# Patient Record
Sex: FEMALE | Race: BLACK OR AFRICAN AMERICAN | Employment: FULL TIME | URBAN - METROPOLITAN AREA
[De-identification: names, ages, dates, MRNs, and addresses within clinical notes are randomized per-mention and may not be internally consistent; named-entity substitution may affect disease eponyms.]

---

## 2017-01-25 ENCOUNTER — ED HISTORICAL/CONVERTED ENCOUNTER (OUTPATIENT)
Dept: OTHER | Age: 32
End: 2017-01-25

## 2017-09-15 ENCOUNTER — OP HISTORICAL/CONVERTED ENCOUNTER (OUTPATIENT)
Dept: OTHER | Age: 32
End: 2017-09-15

## 2017-10-23 ENCOUNTER — OP HISTORICAL/CONVERTED ENCOUNTER (OUTPATIENT)
Dept: OTHER | Age: 32
End: 2017-10-23

## 2017-11-02 ENCOUNTER — OP HISTORICAL/CONVERTED ENCOUNTER (OUTPATIENT)
Dept: OTHER | Age: 32
End: 2017-11-02

## 2018-02-07 ENCOUNTER — OP HISTORICAL/CONVERTED ENCOUNTER (OUTPATIENT)
Dept: OTHER | Age: 33
End: 2018-02-07

## 2018-04-23 ENCOUNTER — OP HISTORICAL/CONVERTED ENCOUNTER (OUTPATIENT)
Dept: OTHER | Age: 33
End: 2018-04-23

## 2018-05-31 ENCOUNTER — OP HISTORICAL/CONVERTED ENCOUNTER (OUTPATIENT)
Dept: OTHER | Age: 33
End: 2018-05-31

## 2018-06-03 ENCOUNTER — ED HISTORICAL/CONVERTED ENCOUNTER (OUTPATIENT)
Dept: OTHER | Age: 33
End: 2018-06-03

## 2019-05-14 ENCOUNTER — ED HISTORICAL/CONVERTED ENCOUNTER (OUTPATIENT)
Dept: OTHER | Age: 34
End: 2019-05-14

## 2019-05-18 ENCOUNTER — ED HISTORICAL/CONVERTED ENCOUNTER (OUTPATIENT)
Dept: OTHER | Age: 34
End: 2019-05-18

## 2019-10-05 ENCOUNTER — ED HISTORICAL/CONVERTED ENCOUNTER (OUTPATIENT)
Dept: OTHER | Age: 34
End: 2019-10-05

## 2020-03-17 ENCOUNTER — ED HISTORICAL/CONVERTED ENCOUNTER (OUTPATIENT)
Dept: OTHER | Age: 35
End: 2020-03-17

## 2020-06-08 ENCOUNTER — ED HISTORICAL/CONVERTED ENCOUNTER (OUTPATIENT)
Dept: OTHER | Age: 35
End: 2020-06-08

## 2020-06-11 ENCOUNTER — ED HISTORICAL/CONVERTED ENCOUNTER (OUTPATIENT)
Dept: OTHER | Age: 35
End: 2020-06-11

## 2020-07-12 ENCOUNTER — ED HISTORICAL/CONVERTED ENCOUNTER (OUTPATIENT)
Dept: OTHER | Age: 35
End: 2020-07-12

## 2020-07-22 ENCOUNTER — TELEPHONE (OUTPATIENT)
Dept: FAMILY MEDICINE CLINIC | Age: 35
End: 2020-07-22

## 2020-07-25 ENCOUNTER — ED HISTORICAL/CONVERTED ENCOUNTER (OUTPATIENT)
Dept: OTHER | Age: 35
End: 2020-07-25

## 2020-07-29 ENCOUNTER — TELEPHONE (OUTPATIENT)
Dept: FAMILY MEDICINE CLINIC | Age: 35
End: 2020-07-29

## 2020-07-30 ENCOUNTER — ED HISTORICAL/CONVERTED ENCOUNTER (OUTPATIENT)
Dept: OTHER | Age: 35
End: 2020-07-30

## 2020-08-29 ENCOUNTER — ED HISTORICAL/CONVERTED ENCOUNTER (OUTPATIENT)
Dept: OTHER | Age: 35
End: 2020-08-29

## 2020-09-07 ENCOUNTER — ED HISTORICAL/CONVERTED ENCOUNTER (OUTPATIENT)
Dept: OTHER | Age: 35
End: 2020-09-07

## 2020-09-12 ENCOUNTER — APPOINTMENT (OUTPATIENT)
Dept: GENERAL RADIOLOGY | Age: 35
End: 2020-09-12
Attending: EMERGENCY MEDICINE
Payer: COMMERCIAL

## 2020-09-12 ENCOUNTER — HOSPITAL ENCOUNTER (EMERGENCY)
Age: 35
Discharge: HOME | End: 2020-09-12
Attending: EMERGENCY MEDICINE
Payer: COMMERCIAL

## 2020-09-12 VITALS
RESPIRATION RATE: 17 BRPM | DIASTOLIC BLOOD PRESSURE: 80 MMHG | TEMPERATURE: 99.3 F | OXYGEN SATURATION: 100 % | WEIGHT: 175 LBS | BODY MASS INDEX: 26.52 KG/M2 | HEIGHT: 68 IN | HEART RATE: 76 BPM | SYSTOLIC BLOOD PRESSURE: 124 MMHG

## 2020-09-12 DIAGNOSIS — F41.1 ANXIETY STATE: Primary | ICD-10-CM

## 2020-09-12 PROCEDURE — 74011250637 HC RX REV CODE- 250/637: Performed by: EMERGENCY MEDICINE

## 2020-09-12 PROCEDURE — 93005 ELECTROCARDIOGRAM TRACING: CPT

## 2020-09-12 PROCEDURE — 99284 EMERGENCY DEPT VISIT MOD MDM: CPT

## 2020-09-12 RX ORDER — ALPRAZOLAM 1 MG/1
1 TABLET ORAL
Qty: 20 TAB | Refills: 0 | Status: SHIPPED | OUTPATIENT
Start: 2020-09-12 | End: 2020-12-03 | Stop reason: ALTCHOICE

## 2020-09-12 RX ORDER — ALPRAZOLAM 0.5 MG/1
1 TABLET ORAL
Status: COMPLETED | OUTPATIENT
Start: 2020-09-12 | End: 2020-09-12

## 2020-09-12 RX ADMIN — ALPRAZOLAM 1 MG: 0.5 TABLET ORAL at 20:39

## 2020-09-12 NOTE — ED TRIAGE NOTES
Patient reports feeling short of breath secondary to her neck feeling inflamed. H/o anxiety, appears very anxious in triage asking if she is having stroke or seizure.

## 2020-09-13 ENCOUNTER — HOSPITAL ENCOUNTER (EMERGENCY)
Age: 35
Discharge: HOME | End: 2020-09-13
Admitting: NURSE PRACTITIONER
Payer: COMMERCIAL

## 2020-09-13 ENCOUNTER — APPOINTMENT (OUTPATIENT)
Dept: CT IMAGING | Age: 35
End: 2020-09-13
Attending: NURSE PRACTITIONER
Payer: COMMERCIAL

## 2020-09-13 VITALS
SYSTOLIC BLOOD PRESSURE: 175 MMHG | WEIGHT: 171 LBS | DIASTOLIC BLOOD PRESSURE: 94 MMHG | RESPIRATION RATE: 18 BRPM | HEART RATE: 82 BPM | BODY MASS INDEX: 25.91 KG/M2 | TEMPERATURE: 98.4 F | HEIGHT: 68 IN | OXYGEN SATURATION: 100 %

## 2020-09-13 DIAGNOSIS — M54.2 NECK PAIN: Primary | ICD-10-CM

## 2020-09-13 DIAGNOSIS — F41.1 ANXIETY STATE: ICD-10-CM

## 2020-09-13 PROCEDURE — 99284 EMERGENCY DEPT VISIT MOD MDM: CPT

## 2020-09-13 PROCEDURE — 70490 CT SOFT TISSUE NECK W/O DYE: CPT

## 2020-09-13 PROCEDURE — 74011250637 HC RX REV CODE- 250/637: Performed by: NURSE PRACTITIONER

## 2020-09-13 RX ORDER — ALPRAZOLAM 0.5 MG/1
1 TABLET ORAL
Status: CANCELLED | OUTPATIENT
Start: 2020-09-13 | End: 2020-09-13

## 2020-09-13 RX ORDER — ALPRAZOLAM 0.5 MG/1
1 TABLET ORAL
Status: COMPLETED | OUTPATIENT
Start: 2020-09-13 | End: 2020-09-13

## 2020-09-13 RX ADMIN — ALPRAZOLAM 1 MG: 0.5 TABLET ORAL at 20:44

## 2020-09-13 NOTE — DISCHARGE INSTRUCTIONS

## 2020-09-13 NOTE — ED TRIAGE NOTES
Pt stated was here yesterday prescribed anxiety meds. Pt stated seen for SOB and neck swelling. Pt stated [facility] and [facility] sent her here. Pt initially stated she did not want anything done to include vitals because she just wants an x-ray.

## 2020-09-13 NOTE — ED PROVIDER NOTES
28y F with PMH anxiety. She is c/o SOB and fullness feeling in throat. No pains, No trauma. No SI or HI. She has an appointmment with her therapist this week. Compliant on Vistaril but doesn't seem to be helping recently. Nothing she can recal has triggered this change. 28y F with anxiety    Past Medical History:   Diagnosis Date    Anxiety     Asthma        History reviewed. No pertinent surgical history. History reviewed. No pertinent family history. Social History     Socioeconomic History    Marital status: SINGLE     Spouse name: Not on file    Number of children: Not on file    Years of education: Not on file    Highest education level: Not on file   Occupational History    Not on file   Social Needs    Financial resource strain: Not on file    Food insecurity     Worry: Not on file     Inability: Not on file    Transportation needs     Medical: Not on file     Non-medical: Not on file   Tobacco Use    Smoking status: Never Smoker    Smokeless tobacco: Never Used   Substance and Sexual Activity    Alcohol use: Never     Frequency: Never    Drug use: Never    Sexual activity: Not on file   Lifestyle    Physical activity     Days per week: Not on file     Minutes per session: Not on file    Stress: Not on file   Relationships    Social connections     Talks on phone: Not on file     Gets together: Not on file     Attends Zoroastrianism service: Not on file     Active member of club or organization: Not on file     Attends meetings of clubs or organizations: Not on file     Relationship status: Not on file    Intimate partner violence     Fear of current or ex partner: Not on file     Emotionally abused: Not on file     Physically abused: Not on file     Forced sexual activity: Not on file   Other Topics Concern    Not on file   Social History Narrative    Not on file         ALLERGIES: Patient has no known allergies. Review of Systems   Constitutional: Negative.   Negative for chills, fatigue and fever. HENT: Negative. Negative for congestion, nosebleeds and sore throat. Eyes: Negative. Negative for pain, discharge and visual disturbance. Respiratory: Positive for chest tightness and shortness of breath. Negative for cough. Cardiovascular: Negative for chest pain, palpitations and leg swelling. Gastrointestinal: Negative for abdominal pain, blood in stool, constipation, diarrhea, nausea and vomiting. Endocrine: Negative. Genitourinary: Negative. Negative for difficulty urinating, dysuria, pelvic pain and vaginal bleeding. Musculoskeletal: Negative. Negative for arthralgias, back pain and myalgias. Skin: Negative. Negative for rash and wound. Allergic/Immunologic: Negative. Neurological: Negative. Negative for dizziness, syncope, weakness, numbness and headaches. Hematological: Negative. Psychiatric/Behavioral: Negative for agitation, confusion and suicidal ideas. The patient is nervous/anxious. All other systems reviewed and are negative. Vitals:    09/12/20 1936   BP: (!) 152/88   Pulse: 96   Resp: 18   Temp: 99.3 °F (37.4 °C)   SpO2: 100%   Weight: 79.4 kg (175 lb)   Height: 5' 8\" (1.727 m)            Physical Exam  Vitals signs and nursing note reviewed. Constitutional:       General: She is not in acute distress. Appearance: Normal appearance. She is normal weight. She is not ill-appearing. HENT:      Head: Normocephalic and atraumatic. Right Ear: External ear normal.      Left Ear: External ear normal.      Nose: Nose normal. No congestion. Mouth/Throat:      Mouth: Mucous membranes are moist.      Pharynx: Oropharynx is clear. Eyes:      Conjunctiva/sclera: Conjunctivae normal.      Pupils: Pupils are equal, round, and reactive to light. Neck:      Musculoskeletal: Neck supple. Cardiovascular:      Rate and Rhythm: Normal rate and regular rhythm. Pulses: Normal pulses. Heart sounds: Normal heart sounds. No murmur. Pulmonary:      Effort: Pulmonary effort is normal. Tachypnea present. No respiratory distress. Breath sounds: Normal breath sounds. Abdominal:      General: Abdomen is flat. Bowel sounds are normal. There is no distension. Palpations: Abdomen is soft. Tenderness: There is no abdominal tenderness. There is no guarding. Musculoskeletal: Normal range of motion. General: No swelling, tenderness, deformity or signs of injury. Right lower leg: No edema. Left lower leg: No edema. Skin:     General: Skin is warm and dry. Capillary Refill: Capillary refill takes less than 2 seconds. Findings: No bruising, lesion or rash. Neurological:      General: No focal deficit present. Mental Status: She is alert and oriented to person, place, and time. Mental status is at baseline. Cranial Nerves: No cranial nerve deficit. Sensory: No sensory deficit. Motor: No weakness. Psychiatric:         Attention and Perception: Attention normal.         Mood and Affect: Mood is anxious. Behavior: Behavior normal.         Thought Content: Thought content normal. Thought content does not include suicidal ideation. Judgment: Judgment normal.          MDM  Number of Diagnoses or Management Options  Anxiety state:   Diagnosis management comments: Normal sinus rhythm. Rate 78. No ST segment changes. No T wave Inversions. Normal Intervals. Interpreted by ED physician. Reason rule out dysarrythmia    MDM: 34yF with PMH anxiety, No si or HI, improved after xanax. Small Rx provided. Pt has close f/u    DX: anxiety  DC home.  improved           Procedures

## 2020-09-14 LAB
ATRIAL RATE: 94 BPM
CALCULATED P AXIS, ECG09: 63 DEGREES
CALCULATED R AXIS, ECG10: 67 DEGREES
CALCULATED T AXIS, ECG11: 20 DEGREES
DIAGNOSIS, 93000: NORMAL
P-R INTERVAL, ECG05: 152 MS
Q-T INTERVAL, ECG07: 348 MS
QRS DURATION, ECG06: 62 MS
QTC CALCULATION (BEZET), ECG08: 435 MS
VENTRICULAR RATE, ECG03: 94 BPM

## 2020-09-14 NOTE — DISCHARGE INSTRUCTIONS
Patient Education        Neck Pain: Care Instructions  Your Care Instructions    You can have neck pain anywhere from the bottom of your head to the top of your shoulders. It can spread to the upper back or arms. Injuries, painting a ceiling, sleeping with your neck twisted, staying in one position for too long, and many other activities can cause neck pain. Most neck pain gets better with home care. Your doctor may recommend medicine to relieve pain or relax your muscles. He or she may suggest exercise and physical therapy to increase flexibility and relieve stress. You may need to wear a special (cervical) collar to support your neck for a day or two. Follow-up care is a key part of your treatment and safety. Be sure to make and go to all appointments, and call your doctor if you are having problems. It's also a good idea to know your test results and keep a list of the medicines you take. How can you care for yourself at home? · Try using a heating pad on a low or medium setting for 15 to 20 minutes every 2 or 3 hours. Try a warm shower in place of one session with the heating pad. · You can also try an ice pack for 10 to 15 minutes every 2 to 3 hours. Put a thin cloth between the ice and your skin. · Take pain medicines exactly as directed. ¨ If the doctor gave you a prescription medicine for pain, take it as prescribed. ¨ If you are not taking a prescription pain medicine, ask your doctor if you can take an over-the-counter medicine. · If your doctor recommends a cervical collar, wear it exactly as directed. When should you call for help? Call your doctor now or seek immediate medical care if:  ? · You have new or worsening numbness in your arms, buttocks or legs. ? · You have new or worsening weakness in your arms or legs. (This could make it hard to stand up.)   ? · You lose control of your bladder or bowels. ? Watch closely for changes in your health, and be sure to contact your doctor if:  ? · Your neck pain is getting worse. ? · You are not getting better after 1 week. ? · You do not get better as expected. Where can you learn more? Go to http://www.gray.com/. Enter [id number] in the search box to learn more about \"Neck Pain: Care Instructions. \"  Current as of: March 21, 2017  Content Version: 11.5  © 0899-7619 [employer]. Care instructions adapted under license by [employer] (which disclaims liability or warranty for this information). If you have questions about a medical condition or this instruction, always ask your healthcare professional. [place] [address] any warranty or liability for your use of this information. Patient Education        Anxiety Disorder: Care Instructions  Your Care Instructions     Anxiety is a normal reaction to stress. Difficult situations can cause you to have symptoms such as sweaty palms and a nervous feeling. In an anxiety disorder, the symptoms are far more severe. Constant worry, muscle tension, trouble sleeping, nausea and diarrhea, and other symptoms can make normal daily activities difficult or impossible. These symptoms may occur for no reason, and they can affect your work, school, or social life. Medicines, counseling, and self-care can all help. Follow-up care is a key part of your treatment and safety. Be sure to make and go to all appointments, and call your doctor if you are having problems. It's also a good idea to know your test results and keep a list of the medicines you take. How can you care for yourself at home? · Take medicines exactly as directed. Call your doctor if you think you are having a problem with your medicine. · Go to your counseling sessions and follow-up appointments. · Recognize and accept your anxiety. Then, when you are in a situation that makes you anxious, say to yourself, \"This is not an emergency. I feel uncomfortable, but I am not in danger.  I can keep going even if I feel anxious. \"  · Be kind to your body:  ? Relieve tension with exercise or a massage. ? Get enough rest.  ? Avoid alcohol, caffeine, nicotine, and illegal drugs. They can increase your anxiety level and cause sleep problems. ? Learn and do relaxation techniques. See below for more about these techniques. · Engage your mind. Get out and do something you enjoy. Go to a funny movie, or take a walk or hike. Plan your day. Having too much or too little to do can make you anxious. · Keep a record of your symptoms. Discuss your fears with a good friend or family member, or join a support group for people with similar problems. Talking to others sometimes relieves stress. · Get involved in social groups, or volunteer to help others. Being alone sometimes makes things seem worse than they are. · Get at least 30 minutes of exercise on most days of the week to relieve stress. Walking is a good choice. You also may want to do other activities, such as running, swimming, cycling, or playing tennis or team sports. Relaxation techniques  Do relaxation exercises 10 to 20 minutes a day. You can play soothing, relaxing music while you do them, if you wish. · Tell others in your house that you are going to do your relaxation exercises. Ask them not to disturb you. · Find a comfortable place, away from all distractions and noise. · Lie down on your back, or sit with your back straight. · Focus on your breathing. Make it slow and steady. · Breathe in through your nose. Breathe out through either your nose or mouth. · Breathe deeply, filling up the area between your navel and your rib cage. Breathe so that your belly goes up and down. · Do not hold your breath. · Breathe like this for 5 to 10 minutes. Notice the feeling of calmness throughout your whole body.   As you continue to breathe slowly and deeply, relax by doing the following for another 5 to 10 minutes:  · Tighten and relax each muscle group in your body. You can begin at your toes and work your way up to your head. · Imagine your muscle groups relaxing and becoming heavy. · Empty your mind of all thoughts. · Let yourself relax more and more deeply. · Become aware of the state of calmness that surrounds you. · When your relaxation time is over, you can bring yourself back to alertness by moving your fingers and toes and then your hands and feet and then stretching and moving your entire body. Sometimes people fall asleep during relaxation, but they usually wake up shortly afterward. · Always give yourself time to return to full alertness before you drive a car or do anything that might cause an accident if you are not fully alert. Never play a relaxation tape while you drive a car. When should you call for help? Call 911 anytime you think you may need emergency care. For example, call if:    · You feel you cannot stop from hurting yourself or someone else. Keep the numbers for these national suicide hotlines: 3-219-102-TALK (5-892.564.8301) and 0-991-DNFWDHG (6-374.926.3359). If you or someone you know talks about suicide or feeling hopeless, get help right away. Watch closely for changes in your health, and be sure to contact your doctor if:    · You have anxiety or fear that affects your life.     · You have symptoms of anxiety that are new or different from those you had before. Where can you learn more? Go to http://abram-beatriz.info/  Enter P754 in the search box to learn more about \"Anxiety Disorder: Care Instructions. \"  Current as of: January 31, 2020               Content Version: 12.6  © 3097-7265 [employer]. Care instructions adapted under license by [employer] (which disclaims liability or warranty for this information).  If you have questions about a medical condition or this instruction, always ask your healthcare professional. [employer] disclaims any warranty or liability for your use of this information.

## 2020-09-16 ENCOUNTER — HOSPITAL ENCOUNTER (EMERGENCY)
Age: 35
Discharge: HOME HEALTH | End: 2020-09-16
Attending: EMERGENCY MEDICINE
Payer: COMMERCIAL

## 2020-09-16 VITALS
HEIGHT: 68 IN | TEMPERATURE: 98.9 F | OXYGEN SATURATION: 99 % | SYSTOLIC BLOOD PRESSURE: 136 MMHG | BODY MASS INDEX: 25.76 KG/M2 | DIASTOLIC BLOOD PRESSURE: 84 MMHG | WEIGHT: 170 LBS | HEART RATE: 84 BPM | RESPIRATION RATE: 16 BRPM

## 2020-09-16 DIAGNOSIS — F41.1 ANXIETY STATE: Primary | ICD-10-CM

## 2020-09-16 LAB
ALBUMIN SERPL-MCNC: 3.9 G/DL (ref 3.5–5)
ALBUMIN/GLOB SERPL: 0.9 {RATIO} (ref 1.1–2.2)
ALP SERPL-CCNC: 47 U/L (ref 45–117)
ALT SERPL-CCNC: 20 U/L (ref 12–78)
AMPHET UR QL SCN: NEGATIVE
ANION GAP SERPL CALC-SCNC: 6 MMOL/L (ref 5–15)
AST SERPL W P-5'-P-CCNC: 16 U/L (ref 15–37)
BARBITURATES UR QL SCN: NEGATIVE
BASOPHILS # BLD: 0 K/UL (ref 0–0.1)
BASOPHILS NFR BLD: 1 % (ref 0–1)
BENZODIAZ UR QL: NEGATIVE
BILIRUB SERPL-MCNC: 0.5 MG/DL (ref 0.2–1)
BUN SERPL-MCNC: 7 MG/DL (ref 6–20)
BUN/CREAT SERPL: 10 (ref 12–20)
CA-I BLD-MCNC: 9.8 MG/DL (ref 8.5–10.1)
CANNABINOIDS UR QL SCN: NEGATIVE
CHLORIDE SERPL-SCNC: 107 MMOL/L (ref 97–108)
CO2 SERPL-SCNC: 25 MMOL/L (ref 21–32)
COCAINE UR QL SCN: NEGATIVE
CREAT SERPL-MCNC: 0.68 MG/DL (ref 0.55–1.02)
DIFFERENTIAL METHOD BLD: ABNORMAL
DRUG SCRN COMMENT,DRGCM: NORMAL
EOSINOPHIL # BLD: 0 K/UL (ref 0–0.4)
EOSINOPHIL NFR BLD: 1 % (ref 0–7)
ERYTHROCYTE [DISTWIDTH] IN BLOOD BY AUTOMATED COUNT: 13 % (ref 11.5–14.5)
ETHANOL SERPL-MCNC: <10 MG/DL
GLOBULIN SER CALC-MCNC: 4.3 G/DL (ref 2–4)
GLUCOSE SERPL-MCNC: 93 MG/DL (ref 65–100)
HCG SERPL QL: NEGATIVE
HCT VFR BLD AUTO: 37.6 % (ref 35–47)
HGB BLD-MCNC: 12.7 % (ref 11.5–16)
IMM GRANULOCYTES # BLD AUTO: 0 K/UL (ref 0–0.04)
IMM GRANULOCYTES NFR BLD AUTO: 0 % (ref 0–0.5)
LYMPHOCYTES # BLD: 1.3 K/UL (ref 0.8–3.5)
LYMPHOCYTES NFR BLD: 39 % (ref 12–49)
MCH RBC QN AUTO: 30 PG (ref 26–34)
MCHC RBC AUTO-ENTMCNC: 33.8 G/DL (ref 30–36.5)
MCV RBC AUTO: 88.9 FL (ref 80–99)
METHADONE UR QL: NEGATIVE
MONOCYTES # BLD: 0.4 K/UL (ref 0–1)
MONOCYTES NFR BLD: 12 % (ref 5–13)
NEUTS SEG # BLD: 1.6 K/UL (ref 1.8–8)
NEUTS SEG NFR BLD: 47 % (ref 32–75)
OPIATES UR QL: NEGATIVE
PCP UR QL: NEGATIVE
PLATELET # BLD AUTO: 259 K/UL (ref 150–400)
PMV BLD AUTO: 11.6 FL (ref 8.9–12.9)
POTASSIUM SERPL-SCNC: 3.5 MMOL/L (ref 3.5–5.1)
PROT SERPL-MCNC: 8.2 G/DL (ref 6.4–8.2)
RBC # BLD AUTO: 4.23 M/UL (ref 3.8–5.2)
SODIUM SERPL-SCNC: 138 MMOL/L (ref 136–145)
WBC # BLD AUTO: 3.4 K/UL (ref 3.6–11)

## 2020-09-16 PROCEDURE — 80307 DRUG TEST PRSMV CHEM ANLYZR: CPT

## 2020-09-16 PROCEDURE — 80053 COMPREHEN METABOLIC PANEL: CPT

## 2020-09-16 PROCEDURE — 84703 CHORIONIC GONADOTROPIN ASSAY: CPT

## 2020-09-16 PROCEDURE — 74011250637 HC RX REV CODE- 250/637: Performed by: EMERGENCY MEDICINE

## 2020-09-16 PROCEDURE — 99284 EMERGENCY DEPT VISIT MOD MDM: CPT

## 2020-09-16 PROCEDURE — 85025 COMPLETE CBC W/AUTO DIFF WBC: CPT

## 2020-09-16 PROCEDURE — 36415 COLL VENOUS BLD VENIPUNCTURE: CPT

## 2020-09-16 RX ORDER — ALPRAZOLAM 0.5 MG/1
1 TABLET ORAL
Status: COMPLETED | OUTPATIENT
Start: 2020-09-16 | End: 2020-09-16

## 2020-09-16 RX ORDER — CYCLOBENZAPRINE HCL 10 MG
10 TABLET ORAL
COMMUNITY
Start: 2020-09-13 | End: 2020-12-03 | Stop reason: ALTCHOICE

## 2020-09-16 RX ORDER — HYDROXYZINE 50 MG/1
50 TABLET, FILM COATED ORAL 3 TIMES DAILY
COMMUNITY
End: 2020-12-08 | Stop reason: ALTCHOICE

## 2020-09-16 RX ORDER — ESCITALOPRAM OXALATE 10 MG/1
10 TABLET ORAL
COMMUNITY
Start: 2020-08-19 | End: 2020-12-03 | Stop reason: ALTCHOICE

## 2020-09-16 RX ADMIN — ALPRAZOLAM 1 MG: 0.5 TABLET ORAL at 09:44

## 2020-09-16 NOTE — BH NOTES
Pt assessed face to face in ED #13. Pt presented to ED via private vehicle from work ([employer]). Pt states she was sent to ED from work. States she got to work and sat on the floor and told her safety staff that she \"didn't feel like herself and scared to go to sleep at night as she might not wake up due to past TBI\". States in May 2020 she was at work, in the bathroom. States she \"dropped her phone, she bent over to pick it up and as she stood she hit her head on the tampon dispenser\". Denies LOC or problems at that time. States 1 week later, she started to have headaches. States she has had 2 brain scans which were negative and has f/u with a Neurologist, Dr. Terrie Walker. States she was dx with Concussion. States she saw him last month and has an appt with him Frid 9-. Pt states she has also f/u with a Psychiatrist, Dr. Moris Rousseau and has an appt Monday 9-. States she was started on Lexapro and Vistaril, cant take Vistaril and work. Pt states she has been in ED on 9-11 and 9-12. States she was started on Xanax which helps. Pt denies SI HI Hallucinationsor thoughts to harm self. Pt states she loves her job, has worked there for 5.5 yrs, and is worried that she will lose her job since they sent her to the ED. Pt denies access to weapons   Pt denies legal issues  Pt denies past Medical H/O  Pt denies Trauma H/O  Pt states family  H/O Mental Illness includes her mom with Anxiety  Pt denies Past IP BH Hospitalizations  Allergies NKA  Current Medications see chart  Pt lives in [place] and her mom has been staying with her since she had the Concussion. Pt feels safe to d/c home. Dr. Dany Riley consulted and states pt does not meet criteria for IP BH Treatment,  to have her f/u with Dr. Moris Rousseau and Dr. Terrie Walker as scheduled. Continue home medications. Gabino Elena RN ED notified.

## 2020-09-16 NOTE — DISCHARGE INSTRUCTIONS

## 2020-09-16 NOTE — ED PROVIDER NOTES
EMERGENCY DEPARTMENT HISTORY AND PHYSICAL EXAM      Date: 9/16/2020  Patient Name: Juvenal Dean    History of Presenting Illness     Chief Complaint   Patient presents with    Mental Health Problem       History Provided By:pt    HPI: Juvenal Dean, 29 y.o. female with a past medical history significant anxiety. seen recently and prescribed anxiety med xanax with relief. Has an appointment scheduled, vistiril in past hasnt helped. No si or HI, no cp or SOB    There are no other complaints, changes, or physical findings at this time. PCP: Bobo Marcos MD    Current Outpatient Medications   Medication Sig Dispense Refill    cyclobenzaprine (FLEXERIL) 10 mg tablet Take 10 mg by mouth nightly.  escitalopram oxalate (LEXAPRO) 10 mg tablet Take 10 mg by mouth.  hydrOXYzine HCL (ATARAX) 50 mg tablet Take 50 mg by mouth three (3) times daily.  ALPRAZolam (Xanax) 1 mg tablet Take 1 Tab by mouth every eight (8) hours as needed for Anxiety. Max Daily Amount: 3 mg. 20 Tab 0       Past History     Past Medical History:  Past Medical History:   Diagnosis Date    Anxiety     Asthma        Past Surgical History:  No past surgical history on file. Family History:  No family history on file. Social History:  Social History     Tobacco Use    Smoking status: Never Smoker    Smokeless tobacco: Never Used   Substance Use Topics    Alcohol use: Never     Frequency: Never    Drug use: Never       Allergies:  No Known Allergies      Review of Systems   Review of Systems   Constitutional: Negative. Negative for chills, fatigue and fever. HENT: Negative. Negative for congestion, nosebleeds and sore throat. Eyes: Negative. Negative for pain, discharge and visual disturbance. Respiratory: Negative. Negative for cough, chest tightness and shortness of breath. Cardiovascular: Negative for chest pain, palpitations and leg swelling.    Gastrointestinal: Negative for abdominal pain, blood in stool, constipation, diarrhea, nausea and vomiting. Endocrine: Negative. Genitourinary: Negative. Negative for difficulty urinating, dysuria, pelvic pain and vaginal bleeding. Musculoskeletal: Negative. Negative for arthralgias, back pain and myalgias. Skin: Negative. Negative for rash and wound. Allergic/Immunologic: Negative. Neurological: Negative. Negative for dizziness, syncope, weakness, numbness and headaches. Hematological: Negative. Psychiatric/Behavioral: Positive for agitation. Negative for confusion and suicidal ideas. The patient is nervous/anxious and is hyperactive. All other systems reviewed and are negative. Physical Exam   Physical Exam  Vitals signs and nursing note reviewed. Exam conducted with a chaperone present. Constitutional:       Appearance: Normal appearance. She is normal weight. HENT:      Head: Normocephalic and atraumatic. Nose: Nose normal.      Mouth/Throat:      Mouth: Mucous membranes are moist.      Pharynx: Oropharynx is clear. Eyes:      Extraocular Movements: Extraocular movements intact. Conjunctiva/sclera: Conjunctivae normal.      Pupils: Pupils are equal, round, and reactive to light. Neck:      Musculoskeletal: Normal range of motion and neck supple. Cardiovascular:      Rate and Rhythm: Normal rate and regular rhythm. Pulses: Normal pulses. Heart sounds: Normal heart sounds. Pulmonary:      Effort: Pulmonary effort is normal. No respiratory distress. Breath sounds: Normal breath sounds. Abdominal:      General: Abdomen is flat. Bowel sounds are normal. There is no distension. Palpations: Abdomen is soft. Tenderness: There is no abdominal tenderness. There is no guarding. Musculoskeletal: Normal range of motion. General: No swelling, tenderness, deformity or signs of injury. Right lower leg: No edema. Left lower leg: No edema. Skin:     General: Skin is warm and dry. Capillary Refill: Capillary refill takes less than 2 seconds. Findings: No lesion or rash. Neurological:      General: No focal deficit present. Mental Status: She is alert and oriented to person, place, and time. Mental status is at baseline. Cranial Nerves: No cranial nerve deficit. Psychiatric:         Mood and Affect: Mood is anxious. Behavior: Behavior normal.         Thought Content: Thought content normal. Thought content does not include homicidal or suicidal ideation. Thought content does not include suicidal plan. Judgment: Judgment normal.         Diagnostic Study Results     Labs -     Recent Results (from the past 12 hour(s))   METABOLIC PANEL, COMPREHENSIVE    Collection Time: 09/16/20 10:10 AM   Result Value Ref Range    Sodium 138 136 - 145 mmol/L    Potassium 3.5 3.5 - 5.1 mmol/L    Chloride 107 97 - 108 mmol/L    CO2 25 21 - 32 mmol/L    Anion gap 6 5 - 15 mmol/L    Glucose 93 65 - 100 mg/dL    BUN 7 6 - 20 mg/dL    Creatinine 0.68 0.55 - 1.02 mg/dL    BUN/Creatinine ratio 10 (L) 12 - 20      GFR est AA >60 >60 ml/min/1.73m2    GFR est non-AA >60 >60 ml/min/1.73m2    Calcium 9.8 8.5 - 10.1 mg/dL    Bilirubin, total 0.5 0.2 - 1.0 mg/dL    AST (SGOT) 16 15 - 37 U/L    ALT (SGPT) 20 12 - 78 U/L    Alk. phosphatase 47 45 - 117 U/L    Protein, total 8.2 6.4 - 8.2 g/dL    Albumin 3.9 3.5 - 5.0 g/dL    Globulin 4.3 (H) 2.0 - 4.0 g/dL    A-G Ratio 0.9 (L) 1.1 - 2.2     ETHYL ALCOHOL    Collection Time: 09/16/20 10:10 AM   Result Value Ref Range    ALCOHOL(ETHYL),SERUM <10 <10 mg/dL   HCG QL SERUM    Collection Time: 09/16/20 10:10 AM   Result Value Ref Range    HCG, Ql. Negative Negative         Radiologic Studies -   No orders to display     CT Results  (Last 48 hours)    None        CXR Results  (Last 48 hours)    None          Medical Decision Making and ED Course   I am the first provider for this patient.     I reviewed the vital signs, available nursing notes, past medical history, past surgical history, family history and social history. Vital Signs-Reviewed the patient's vital signs. Patient Vitals for the past 12 hrs:   Temp Pulse Resp BP SpO2   09/16/20 0908 98.9 °F (37.2 °C) 82 18 133/88 95 %       EKG interpretation:             Provider Notes (Medical Decision Making):   34yF with anxiety and meds at home, No SI or HI, seen by [address] [place] [address] and no admit indicated    ED Course:   Initial assessment performed. The patients presenting problems have been discussed, and they are in agreement with the care plan formulated and outlined with them. I have encouraged them to ask questions as they arise throughout their visit. CONSULTANTS:  Psychiatry behavioral health evaluated the patient. Does not meet admission criteria. Recommend discharge home to continue on the Xanax prescription prior received. She does have an appointment on Monday which they encouraged her to go to. Procedures                 Disposition         DISCHARGE PLAN:    Patient is discharged home. Discharge instructions provided. Patient is stable and improved at time of disposition. Vitals are stable. 1.   Current Discharge Medication List      CONTINUE these medications which have NOT CHANGED    Details   cyclobenzaprine (FLEXERIL) 10 mg tablet Take 10 mg by mouth nightly. escitalopram oxalate (LEXAPRO) 10 mg tablet Take 10 mg by mouth. hydrOXYzine HCL (ATARAX) 50 mg tablet Take 50 mg by mouth three (3) times daily. ALPRAZolam (Xanax) 1 mg tablet Take 1 Tab by mouth every eight (8) hours as needed for Anxiety. Max Daily Amount: 3 mg. Qty: 20 Tab, Refills: 0    Associated Diagnoses: Anxiety state           2. Follow-up Information     Follow up With Specialties Details Why Contact Info    psych  Call      Anderson Sal MD [address]  [phone]          3.   Return to ED if worse     Pt voiced they understand they plan and do not have questions at this time    Discharged      Diagnosis     Clinical Impression:   1. Anxiety state        Attestations:    Bree Natarajan MD    Please note that this dictation was completed with [employer], the computer voice recognition software. Quite often unanticipated grammatical, syntax, homophones, and other interpretive errors are inadvertently transcribed by the computer software. Please disregard these errors. Please excuse any errors that have escaped final proofreading. Thank you.

## 2020-09-16 NOTE — ED TRIAGE NOTES
Patient reports she was sent from work for a mental health evaluation. Pt crying in triage stating \" I fucked up my life\" patient started passing and banging on the wall ni triage delgadillo.  Pt did deny SI

## 2020-09-28 NOTE — ED PROVIDER NOTES
EMERGENCY DEPARTMENT HISTORY AND PHYSICAL EXAM      Date: 9/13/2020  Patient Name: Rodrigo Thompson    History of Presenting Illness     Chief Complaint   Patient presents with    Neck Pain    Shortness of Breath       History Provided By: Patient and Patient's Mother    HPI: Rodrigo Thompson, 29 y.o. female with a past medical history significant No significant past medical history presents to the ED accompanied by her mother with cc of neck swelling. She reports noting swelling to her right anterior neck onset this morning. She reports some tenderness to palpation but denies any associated trauma, fever/chills, dysphagia, sore throat or any recent illness. Of note, she was seen yesterday and d/c home with anxiety medications. She is very anxious today and admits to a lot of worrying and stressors; she has not tried or taken any of the medications prescribed. There are no other complaints, changes, or physical findings at this time. PCP: Gunner Yoon MD    Current Outpatient Medications   Medication Sig Dispense Refill    cyclobenzaprine (FLEXERIL) 10 mg tablet Take 10 mg by mouth nightly.  escitalopram oxalate (LEXAPRO) 10 mg tablet Take 10 mg by mouth.  hydrOXYzine HCL (ATARAX) 50 mg tablet Take 50 mg by mouth three (3) times daily.  ALPRAZolam (Xanax) 1 mg tablet Take 1 Tab by mouth every eight (8) hours as needed for Anxiety. Max Daily Amount: 3 mg. 20 Tab 0       Past History     Past Medical History:  Past Medical History:   Diagnosis Date    Anxiety     Asthma        Past Surgical History:  No past surgical history on file. Family History:  No family history on file. Social History:  Social History     Tobacco Use    Smoking status: Never Smoker    Smokeless tobacco: Never Used   Substance Use Topics    Alcohol use: Never     Frequency: Never    Drug use: Never       Allergies:  No Known Allergies      Review of Systems     Review of Systems   Constitutional: Negative. Negative for chills, fatigue and fever. HENT: Negative. Negative for rhinorrhea, sinus pressure, sinus pain, sore throat and trouble swallowing. Respiratory: Negative. Negative for cough, choking and shortness of breath. Cardiovascular: Negative. Negative for chest pain and palpitations. Gastrointestinal: Negative. Negative for abdominal pain, blood in stool, constipation, diarrhea, nausea and vomiting. Genitourinary: Negative. Negative for dysuria and hematuria. Musculoskeletal: Positive for joint swelling and neck pain. Skin: Negative. Negative for rash. Neurological: Negative. Negative for dizziness, speech difficulty, weakness, light-headedness and headaches. Hematological: Negative for adenopathy. Psychiatric/Behavioral: Negative for hallucinations, self-injury, sleep disturbance and suicidal ideas. The patient is nervous/anxious. All other systems reviewed and are negative. Physical Exam     Physical Exam  Vitals signs and nursing note reviewed. Constitutional:       General: She is not in acute distress. Appearance: Normal appearance. HENT:      Head: Normocephalic and atraumatic. Eyes:      Extraocular Movements: Extraocular movements intact. Conjunctiva/sclera: Conjunctivae normal.   Neck:      Musculoskeletal: Normal range of motion and neck supple. Normal range of motion. Pain with movement and muscular tenderness present. No erythema, neck rigidity, crepitus, injury or spinous process tenderness. Thyroid: No thyromegaly or thyroid tenderness. Trachea: Trachea and phonation normal. No tracheal tenderness, abnormal tracheal secretions or tracheal deviation. Cardiovascular:      Rate and Rhythm: Normal rate and regular rhythm. Heart sounds: Normal heart sounds. No murmur. Pulmonary:      Effort: Pulmonary effort is normal.      Breath sounds: Normal breath sounds. No wheezing or rales. Chest:      Chest wall: No tenderness. Abdominal:      General: Bowel sounds are normal.      Palpations: Abdomen is soft. Tenderness: There is no abdominal tenderness. There is no right CVA tenderness or left CVA tenderness. Musculoskeletal: Normal range of motion. Lymphadenopathy:      Head:      Right side of head: No tonsillar adenopathy. Cervical: No cervical adenopathy. Right cervical: No superficial, deep or posterior cervical adenopathy. Skin:     General: Skin is warm and dry. Findings: No rash. Neurological:      General: No focal deficit present. Mental Status: She is alert. Psychiatric:         Attention and Perception: Attention and perception normal. She is attentive. She does not perceive auditory or visual hallucinations. Mood and Affect: Mood is anxious. Affect is flat. Speech: Speech normal.         Behavior: Behavior normal. Behavior is cooperative. Cognition and Memory: Cognition normal.         Diagnostic Study Results     Labs -   No results found for this or any previous visit (from the past 12 hour(s)). Radiologic Studies -   CT Results (most recent):  Results from Hospital Encounter encounter on 09/13/20   CT NECK SOFT TISSUE WO CONT    Narrative Neck swelling. Right side per patient. No comparison available from the PACS archive at this time. TECHNIQUE: Axial imaging of the neck without IV contrast, with multiplanar  reformatting. Dose reduction: All CT scans at this facility are performed using dose reduction  optimization techniques as appropriate to a performed exam including the  following: Automated exposure control, adjustments of the mA and/or kV according  to patient's size, or use of iterative reconstruction technique. FINDINGS: The mucosal surfaces are grossly symmetric, and the airway is  maintained. Thyroid gland, submandibular glands, parotid glands are grossly  unremarkable for noncontrast technique.  The parapharyngeal and retropharyngeal  fat planes are maintained. No lymphadenopathy. Orbits unremarkable. Minimal  mucosal thickening left maxillary sinus. Mastoid air cells are unopacified. Bony  skeleton is intact. Lung apices clear. Impression IMPRESSION:  1. No acute findings given noncontrast technique. 2. Minimal mucosal thickening of the left maxillary sinus, likely chronic. CXR Results  (Last 48 hours)    None           Medical Decision Making and ED Course   I am the first provider for this patient. I reviewed the vital signs, available nursing notes, past medical history, past surgical history, family history and social history. Vital Signs-Reviewed the patient's vital signs. No data found. Records Reviewed: Nursing Notes and Old Medical Records    The patient presents with neck swelling with a differential diagnosis of trauma, peritonsillar abscess, lymphadenopathy, viral syndrome, pharyngitis, goiter      Provider Notes (Medical Decision Making):     CT soft tissue neck benign, VS stable - afebrile and not hypoxic. Pt given PO ativan while waiting, states she is feeling better and does not feel her throat is swollen any longer. She is able to talk in complete sentences. She was encourage to stay hydrated, discussed taking OTC benadryl should symptoms return. Encouraged f/u with PCP regarding anxiety symptoms. ED Course:   Initial assessment performed. The patients presenting problems have been discussed, and they are in agreement with the care plan formulated and outlined with them. I have encouraged them to ask questions as they arise throughout their visit. Disposition     Discharged      DISCHARGE PLAN:  1. Cannot display discharge medications since this patient is not currently admitted.     2.   Follow-up Information     Follow up With Specialties Details Why Contact Info    Edwar Lock MD Family Medicine In 2 days  [place] [address] [address] [facility] [id number] [phone]      [address] DEPT Emergency Medicine  If symptoms worsen [address] [facility] [address] [phone]        3. Return to ED if worse     Diagnosis     Clinical Impression:   1. Neck pain    2. Anxiety state        Attestations:    Ursula Sethi NP    Please note that this dictation was completed with Fancy, the computer voice recognition software. Quite often unanticipated grammatical, syntax, homophones, and other interpretive errors are inadvertently transcribed by the computer software. Please disregard these errors. Please excuse any errors that have escaped final proofreading. Thank you.

## 2020-09-29 VITALS
OXYGEN SATURATION: 99 % | HEART RATE: 95 BPM | SYSTOLIC BLOOD PRESSURE: 125 MMHG | RESPIRATION RATE: 16 BRPM | TEMPERATURE: 98.9 F | DIASTOLIC BLOOD PRESSURE: 72 MMHG | HEIGHT: 67 IN | BODY MASS INDEX: 28.17 KG/M2 | WEIGHT: 179.5 LBS

## 2020-09-29 PROBLEM — D25.9 UTERINE LEIOMYOMA: Status: ACTIVE | Noted: 2020-09-29

## 2020-09-29 PROBLEM — M54.2 NECK PAIN: Status: ACTIVE | Noted: 2020-09-29

## 2020-09-29 PROBLEM — D50.9 IRON DEFICIENCY ANEMIA: Status: ACTIVE | Noted: 2020-09-29

## 2020-09-29 RX ORDER — PROCHLORPERAZINE MALEATE 10 MG
5 TABLET ORAL
COMMUNITY
End: 2020-12-03 | Stop reason: ALTCHOICE

## 2020-09-29 RX ORDER — LANOLIN ALCOHOL/MO/W.PET/CERES
CREAM (GRAM) TOPICAL
COMMUNITY
End: 2020-12-03 | Stop reason: ALTCHOICE

## 2020-09-29 RX ORDER — TRAZODONE HYDROCHLORIDE 50 MG/1
TABLET ORAL
COMMUNITY
End: 2020-12-03 | Stop reason: ALTCHOICE

## 2020-09-29 RX ORDER — AMOXICILLIN 500 MG/1
TABLET, FILM COATED ORAL
COMMUNITY
End: 2020-12-03 | Stop reason: ALTCHOICE

## 2020-09-29 RX ORDER — PENICILLIN V POTASSIUM 500 MG/1
TABLET, FILM COATED ORAL 4 TIMES DAILY
COMMUNITY
End: 2020-12-03 | Stop reason: ALTCHOICE

## 2020-09-29 RX ORDER — CHLORHEXIDINE GLUCONATE 1.2 MG/ML
15 RINSE ORAL EVERY 12 HOURS
COMMUNITY
End: 2020-12-03 | Stop reason: ALTCHOICE

## 2020-09-29 RX ORDER — OXYCODONE AND ACETAMINOPHEN 5; 325 MG/1; MG/1
TABLET ORAL
COMMUNITY
End: 2020-12-03 | Stop reason: ALTCHOICE

## 2020-09-29 RX ORDER — HYDROCHLOROTHIAZIDE 25 MG/1
25 TABLET ORAL DAILY
COMMUNITY
End: 2020-12-03 | Stop reason: ALTCHOICE

## 2020-09-29 RX ORDER — PROMETHAZINE HYDROCHLORIDE 25 MG/1
SUPPOSITORY RECTAL
COMMUNITY
End: 2020-12-03 | Stop reason: ALTCHOICE

## 2020-09-29 RX ORDER — METHYLPREDNISOLONE 4 MG/1
TABLET ORAL
COMMUNITY
End: 2020-12-03 | Stop reason: ALTCHOICE

## 2020-09-29 RX ORDER — ACETAMINOPHEN AND CODEINE PHOSPHATE 300; 30 MG/1; MG/1
1 TABLET ORAL
COMMUNITY
End: 2020-12-03 | Stop reason: ALTCHOICE

## 2020-09-29 RX ORDER — HYDROMORPHONE HYDROCHLORIDE 4 MG/1
TABLET ORAL
COMMUNITY
End: 2020-12-03 | Stop reason: ALTCHOICE

## 2020-09-29 RX ORDER — MECLIZINE HCL 12.5 MG 12.5 MG/1
TABLET ORAL
COMMUNITY
End: 2020-12-08 | Stop reason: ALTCHOICE

## 2020-09-29 RX ORDER — ALBUTEROL SULFATE 90 UG/1
AEROSOL, METERED RESPIRATORY (INHALATION)
COMMUNITY
End: 2020-10-25

## 2020-09-29 RX ORDER — IBUPROFEN 600 MG/1
TABLET ORAL
COMMUNITY
End: 2020-12-08 | Stop reason: ALTCHOICE

## 2020-09-29 RX ORDER — DICLOFENAC SODIUM 75 MG/1
TABLET, DELAYED RELEASE ORAL
COMMUNITY
End: 2020-12-08 | Stop reason: ALTCHOICE

## 2020-09-29 RX ORDER — AMOXICILLIN 500 MG/1
500 CAPSULE ORAL
COMMUNITY
End: 2020-12-03 | Stop reason: ALTCHOICE

## 2020-10-02 ENCOUNTER — HOSPITAL ENCOUNTER (EMERGENCY)
Age: 35
Discharge: HOME | End: 2020-10-02
Attending: STUDENT IN AN ORGANIZED HEALTH CARE EDUCATION/TRAINING PROGRAM
Payer: COMMERCIAL

## 2020-10-02 VITALS
TEMPERATURE: 98.4 F | OXYGEN SATURATION: 100 % | HEIGHT: 68 IN | SYSTOLIC BLOOD PRESSURE: 120 MMHG | HEART RATE: 88 BPM | BODY MASS INDEX: 25.01 KG/M2 | DIASTOLIC BLOOD PRESSURE: 85 MMHG | RESPIRATION RATE: 16 BRPM | WEIGHT: 165 LBS

## 2020-10-02 DIAGNOSIS — J39.2 THROAT IRRITATION: Primary | ICD-10-CM

## 2020-10-02 PROCEDURE — 99281 EMR DPT VST MAYX REQ PHY/QHP: CPT

## 2020-10-02 NOTE — ED PROVIDER NOTES
EMERGENCY DEPARTMENT HISTORY AND PHYSICAL EXAM      Date: 10/2/2020  Patient Name: Roberto Morel    History of Presenting Illness     Chief Complaint   Patient presents with    Laryngitis       History Provided By: Patient    HPI: Roberto Morel, 29 y.o. female with a past medical history significant anxiety,  neck pain presents to the ED with cc of dryness, soreness to R side of throat. Patient states over the last several months she has had intermittent pain/irritation to her throat, describes as sorenesss, dryness, denies any significant pain. Occasionally states she feels like its hard to swallow, and feels like food may be getting stuck. Denies any fevers/chills, no trouble breathing, no throat swelling. No nausea/vomiting. Patient has been seen multiple times for similar compalint, last seen 9/13 in ED, with CT scan of neck which did not show any gross abnormality. Patient reports that she also has seen an ENT physician some time ago for the same complaint, but reportedly did not find any gross abnormalities. There are no other complaints, changes, or physical findings at this time. PCP: Yuko Kaufman MD    No current facility-administered medications on file prior to encounter. Current Outpatient Medications on File Prior to Encounter   Medication Sig Dispense Refill    hydroCHLOROthiazide (HYDRODIURIL) 25 mg tablet Take 25 mg by mouth daily.  ibuprofen (MOTRIN) 600 mg tablet Take  by mouth every six (6) hours as needed for Pain.  albuterol (ProAir HFA) 90 mcg/actuation inhaler Take  by inhalation.  acetaminophen-codeine (TYLENOL #3) 300-30 mg per tablet Take 1 Tab by mouth every four (4) hours as needed for Pain.  amoxicillin (AMOXIL) 500 mg capsule Take 500 mg by mouth.  amoxicillin 500 mg tab Take  by mouth.  chlorhexidine (PERIDEX) 0.12 % solution 15 mL by Swish and Spit route every twelve (12) hours.       diclofenac EC (VOLTAREN) 75 mg EC tablet Take  by mouth.  loratadine 10 mg cap Take  by mouth.  meclizine (ANTIVERT) 12.5 mg tablet Take  by mouth three (3) times daily as needed for Dizziness.  methylPREDNISolone (MEDROL DOSEPACK) 4 mg tablet Take  by mouth.  penicillin v potassium (VEETID) 500 mg tablet Take  by mouth four (4) times daily.  traZODone (DESYREL) 50 mg tablet Take  by mouth nightly.  ferrous sulfate 325 mg (65 mg iron) tablet Take  by mouth Daily (before breakfast).  HYDROmorphone (DILAUDID) 4 mg tablet Take  by mouth every three (3) hours as needed for Pain.  oxyCODONE-acetaminophen (PERCOCET) 5-325 mg per tablet Take  by mouth every four (4) hours as needed for Pain.  promethazine (Phenadoz) 25 mg suppository Insert  into rectum every six (6) hours as needed for Nausea.  prochlorperazine (COMPAZINE) 10 mg tablet Take 5 mg by mouth every six (6) hours as needed for Nausea or Vomiting.  cyclobenzaprine (FLEXERIL) 10 mg tablet Take 10 mg by mouth nightly.  escitalopram oxalate (LEXAPRO) 10 mg tablet Take 10 mg by mouth.  hydrOXYzine HCL (ATARAX) 50 mg tablet Take 50 mg by mouth three (3) times daily.  ALPRAZolam (Xanax) 1 mg tablet Take 1 Tab by mouth every eight (8) hours as needed for Anxiety. Max Daily Amount: 3 mg. 20 Tab 0       Past History     Past Medical History:  Past Medical History:   Diagnosis Date    Anxiety     Asthma     Iron deficiency anemia 9/29/2020    Neck pain 9/29/2020    Uterine leiomyoma 9/29/2020       Past Surgical History:  No past surgical history on file. Family History:  No family history on file.     Social History:  Social History     Tobacco Use    Smoking status: Never Smoker    Smokeless tobacco: Never Used   Substance Use Topics    Alcohol use: Never     Frequency: Never    Drug use: Never       Allergies:  No Known Allergies      Review of Systems     Review of Systems   Constitutional: Negative for activity change, appetite change, fever and unexpected weight change. HENT: Positive for sore throat, trouble swallowing and voice change. Negative for congestion and rhinorrhea. Respiratory: Negative for apnea, choking, shortness of breath and stridor. Cardiovascular: Negative for chest pain and palpitations. Gastrointestinal: Negative for abdominal pain, nausea and vomiting. Musculoskeletal: Positive for back pain, neck pain and neck stiffness. Skin: Negative for color change. Neurological: Negative for dizziness and headaches. Physical Exam     Physical Exam  Vitals signs and nursing note reviewed. Constitutional:       General: She is not in acute distress. Appearance: Normal appearance. She is not ill-appearing or diaphoretic. HENT:      Head: Normocephalic and atraumatic. Nose: Nose normal.      Mouth/Throat:      Lips: Pink. Mouth: Mucous membranes are moist.      Tongue: No lesions. Palate: No mass. Pharynx: Oropharynx is clear. Uvula midline. No oropharyngeal exudate or posterior oropharyngeal erythema. Eyes:      Extraocular Movements: Extraocular movements intact. Conjunctiva/sclera: Conjunctivae normal.   Neck:      Musculoskeletal: Neck supple. No edema, neck rigidity, crepitus, injury, pain with movement, spinous process tenderness or muscular tenderness. Thyroid: No thyroid mass. Vascular: No carotid bruit. Trachea: Trachea and phonation normal.   Cardiovascular:      Rate and Rhythm: Normal rate and regular rhythm. Pulmonary:      Effort: Pulmonary effort is normal. No respiratory distress. Breath sounds: Normal breath sounds. No stridor. Abdominal:      General: Abdomen is flat. Palpations: Abdomen is soft. Musculoskeletal: Normal range of motion. General: No swelling. Lymphadenopathy:      Cervical: No cervical adenopathy. Skin:     General: Skin is warm and dry.       Capillary Refill: Capillary refill takes less than 2 seconds. Neurological:      General: No focal deficit present. Mental Status: She is alert and oriented to person, place, and time. Cranial Nerves: No cranial nerve deficit. Diagnostic Study Results         Medical Decision Making   I am the first provider for this patient. I reviewed the vital signs, available nursing notes, past medical history, past surgical history, family history and social history. Vital Signs-Reviewed the patient's vital signs. Patient Vitals for the past 12 hrs:   Temp Pulse Resp BP SpO2   10/02/20 1904 98.4 °F (36.9 °C) 88 16 120/85 100 %       Records Reviewed: Previous Radiology Studies   CT scan of neck w/o contrast from 9/13, showed no acute findings in the neck. The patient presents with throat pain with a differential diagnosis of pharyngitis, laryngitis, chronic dry mouth, soft tissue injury. Provider Notes (Medical Decision Making):   Patient presents with throat irritation, dryness, trouble swallowing, chronic in nature. Recent work up did not reveal any significant pharyngeal abnormality. No recent trauma, no concerning signs of abscess/infection, or airway compromise. Patient had CT scan 2 weeks ago which was essentially normal.   No indication for repeat radiology today. [facility]         ED Course:   Initial assessment performed. The patients presenting problems have been discussed, and they are in agreement with the care plan formulated and outlined with them. I have encouraged them to ask questions as they arise throughout their visit. Discussed prior CT findings and possible nature of throat sensation. Re-assured patient. Will dc home with hurricaine solution  Will provide patient with information for ENT f/u   Return precautions discussed with patient, including returning if any worsening pain, throat swelling, trouble breathing, high fever. PROCEDURES  Procedures         PLAN:  1.    Current Discharge Medication List      START taking these medications    Details   benzocaine (HurriCaine) 20 % spra 1 Spray by Mucous Membrane route as needed for Pain. Qty: 1 Can, Refills: 0           2. Follow-up Information     Follow up With Specialties Details Why [address] [facility] Otolaryngology In 1 week If symptoms worsen Lilia R[address]os [address] [facility]  [phone]        Return to ED if worse     Diagnosis     Clinical Impression:   1.  Throat irritation

## 2020-10-02 NOTE — ED TRIAGE NOTES
Pt with neck injury from a work injury May 2020. States she has no new pain or injury, but feels that her voice is hoarse and there is swelling to right side of neck.

## 2020-10-03 NOTE — DISCHARGE INSTRUCTIONS
Take hurricane solution as prescribed as needed for pain. Follow up with ENT if sensation does not improve. Return to the ER if any worsening pain, soreness, any high fevers/chills, or trouble breathing.

## 2020-10-25 ENCOUNTER — HOSPITAL ENCOUNTER (EMERGENCY)
Age: 35
Discharge: HOME | End: 2020-10-25
Payer: COMMERCIAL

## 2020-10-25 VITALS
OXYGEN SATURATION: 100 % | RESPIRATION RATE: 18 BRPM | HEIGHT: 68 IN | TEMPERATURE: 98.8 F | WEIGHT: 174 LBS | SYSTOLIC BLOOD PRESSURE: 147 MMHG | HEART RATE: 102 BPM | BODY MASS INDEX: 26.37 KG/M2 | DIASTOLIC BLOOD PRESSURE: 93 MMHG

## 2020-10-25 DIAGNOSIS — J45.21 MILD INTERMITTENT ASTHMA WITH ACUTE EXACERBATION: Primary | ICD-10-CM

## 2020-10-25 PROCEDURE — 74011000250 HC RX REV CODE- 250: Performed by: NURSE PRACTITIONER

## 2020-10-25 PROCEDURE — 96372 THER/PROPH/DIAG INJ SC/IM: CPT

## 2020-10-25 PROCEDURE — 74011250636 HC RX REV CODE- 250/636: Performed by: NURSE PRACTITIONER

## 2020-10-25 PROCEDURE — 94640 AIRWAY INHALATION TREATMENT: CPT

## 2020-10-25 PROCEDURE — 99283 EMERGENCY DEPT VISIT LOW MDM: CPT

## 2020-10-25 RX ORDER — IPRATROPIUM BROMIDE AND ALBUTEROL SULFATE 2.5; .5 MG/3ML; MG/3ML
3 SOLUTION RESPIRATORY (INHALATION)
Status: COMPLETED | OUTPATIENT
Start: 2020-10-25 | End: 2020-10-25

## 2020-10-25 RX ORDER — DEXAMETHASONE SODIUM PHOSPHATE 10 MG/ML
10 INJECTION INTRAMUSCULAR; INTRAVENOUS ONCE
Status: COMPLETED | OUTPATIENT
Start: 2020-10-25 | End: 2020-10-25

## 2020-10-25 RX ORDER — ALBUTEROL SULFATE 90 UG/1
2 AEROSOL, METERED RESPIRATORY (INHALATION)
Qty: 1 INHALER | Refills: 0 | Status: SHIPPED | OUTPATIENT
Start: 2020-10-25 | End: 2020-11-08

## 2020-10-25 RX ORDER — PREDNISONE 50 MG/1
50 TABLET ORAL DAILY
Qty: 3 TAB | Refills: 0 | Status: SHIPPED | OUTPATIENT
Start: 2020-10-25 | End: 2020-10-28

## 2020-10-25 RX ADMIN — DEXAMETHASONE SODIUM PHOSPHATE 10 MG: 10 INJECTION, SOLUTION INTRAMUSCULAR; INTRAVENOUS at 03:01

## 2020-10-25 RX ADMIN — IPRATROPIUM BROMIDE AND ALBUTEROL SULFATE 3 ML: .5; 3 SOLUTION RESPIRATORY (INHALATION) at 02:50

## 2020-10-25 NOTE — ED TRIAGE NOTES
Pt has hx of asthma is feeling wheezy and inhaler is not working sx started last night at 10pm got some relief with inhaler at that time and went back to bed then  attempted using inhaler again with no real relief.

## 2020-10-25 NOTE — ED PROVIDER NOTES
EMERGENCY DEPARTMENT HISTORY AND PHYSICAL EXAM      Date: 10/25/2020  Patient Name: Didi Yusuf    History of Presenting Illness     Chief Complaint   Patient presents with    Wheezing       History Provided By: Patient    HPI: Didi Yusuf, 28 y.o. female with a past medical history significant asthma presents to the ED with cc of sob, wheezing, MDI not helping. There are no other complaints, changes, or physical findings at this time. PCP: Madhavi Martinez MD    No current facility-administered medications on file prior to encounter. Current Outpatient Medications on File Prior to Encounter   Medication Sig Dispense Refill    benzocaine (HurriCaine) 20 % spra 1 Spray by Mucous Membrane route as needed for Pain. 1 Can 0    hydroCHLOROthiazide (HYDRODIURIL) 25 mg tablet Take 25 mg by mouth daily.  ibuprofen (MOTRIN) 600 mg tablet Take  by mouth every six (6) hours as needed for Pain.  acetaminophen-codeine (TYLENOL #3) 300-30 mg per tablet Take 1 Tab by mouth every four (4) hours as needed for Pain.  amoxicillin (AMOXIL) 500 mg capsule Take 500 mg by mouth.  amoxicillin 500 mg tab Take  by mouth.  chlorhexidine (PERIDEX) 0.12 % solution 15 mL by Swish and Spit route every twelve (12) hours.  diclofenac EC (VOLTAREN) 75 mg EC tablet Take  by mouth.  loratadine 10 mg cap Take  by mouth.  meclizine (ANTIVERT) 12.5 mg tablet Take  by mouth three (3) times daily as needed for Dizziness.  methylPREDNISolone (MEDROL DOSEPACK) 4 mg tablet Take  by mouth.  penicillin v potassium (VEETID) 500 mg tablet Take  by mouth four (4) times daily.  traZODone (DESYREL) 50 mg tablet Take  by mouth nightly.  ferrous sulfate 325 mg (65 mg iron) tablet Take  by mouth Daily (before breakfast).  HYDROmorphone (DILAUDID) 4 mg tablet Take  by mouth every three (3) hours as needed for Pain.       oxyCODONE-acetaminophen (PERCOCET) 5-325 mg per tablet Take  by mouth every four (4) hours as needed for Pain.  promethazine (Phenadoz) 25 mg suppository Insert  into rectum every six (6) hours as needed for Nausea.  prochlorperazine (COMPAZINE) 10 mg tablet Take 5 mg by mouth every six (6) hours as needed for Nausea or Vomiting.  [DISCONTINUED] albuterol (ProAir HFA) 90 mcg/actuation inhaler Take  by inhalation.  cyclobenzaprine (FLEXERIL) 10 mg tablet Take 10 mg by mouth nightly.  escitalopram oxalate (LEXAPRO) 10 mg tablet Take 10 mg by mouth.  hydrOXYzine HCL (ATARAX) 50 mg tablet Take 50 mg by mouth three (3) times daily.  ALPRAZolam (Xanax) 1 mg tablet Take 1 Tab by mouth every eight (8) hours as needed for Anxiety. Max Daily Amount: 3 mg. 20 Tab 0       Past History     Past Medical History:  Past Medical History:   Diagnosis Date    Anxiety     Asthma     Iron deficiency anemia 9/29/2020    Neck pain 9/29/2020    Uterine leiomyoma 9/29/2020       Past Surgical History:  History reviewed. No pertinent surgical history. Family History:  History reviewed. No pertinent family history. Social History:  Social History     Tobacco Use    Smoking status: Never Smoker    Smokeless tobacco: Never Used   Substance Use Topics    Alcohol use: Never     Frequency: Never    Drug use: Never       Allergies:  No Known Allergies      Review of Systems     Review of Systems   Constitutional: Negative. HENT: Negative. Respiratory: Positive for cough, shortness of breath and wheezing. Cardiovascular: Negative. Gastrointestinal: Negative. Genitourinary: Negative. Musculoskeletal: Negative. Neurological: Negative. Physical Exam     Physical Exam  Constitutional:       Appearance: Normal appearance. She is normal weight. HENT:      Head: Normocephalic and atraumatic. Eyes:      Extraocular Movements: Extraocular movements intact. Pupils: Pupils are equal, round, and reactive to light.    Cardiovascular: Rate and Rhythm: Normal rate and regular rhythm. Pulses: Normal pulses. Heart sounds: Normal heart sounds. Pulmonary:      Effort: Pulmonary effort is normal. No respiratory distress. Breath sounds: Wheezing present. No rales. Chest:      Chest wall: No tenderness. Abdominal:      General: Abdomen is flat. Musculoskeletal: Normal range of motion. Skin:     General: Skin is warm and dry. Neurological:      General: No focal deficit present. Mental Status: She is alert and oriented to person, place, and time. Psychiatric:         Mood and Affect: Mood normal.         Behavior: Behavior normal.         Diagnostic Study Results     Labs -   No results found for this or any previous visit (from the past 12 hour(s)). Radiologic Studies -   @lastxrresult@  CT Results  (Last 48 hours)    None        CXR Results  (Last 48 hours)    None            Medical Decision Making   I am the first provider for this patient. I reviewed the vital signs, available nursing notes, past medical history, past surgical history, family history and social history. Vital Signs-Reviewed the patient's vital signs. Patient Vitals for the past 12 hrs:   Temp Pulse Resp BP SpO2   10/25/20 0226 98.8 °F (37.1 °C) (!) 113 22 (!) 147/93 98 %       Records Reviewed: Nursing Notes    The patient presents with sob with a differential diagnosis of asthma, allergic reaction, dyspnea      Provider Notes (Medical Decision Making):     MDM  Number of Diagnoses or Management Options  Mild intermittent asthma with acute exacerbation: new, needed workup  Risk of Complications, Morbidity, and/or Mortality  Presenting problems: low  Diagnostic procedures: low  Management options: low    Patient Progress  Patient progress: improved           ED Course:   Initial assessment performed. The patients presenting problems have been discussed, and they are in agreement with the care plan formulated and outlined with them.   I have encouraged them to ask questions as they arise throughout their visit. PLAN:  1. Current Discharge Medication List      START taking these medications    Details   predniSONE (DELTASONE) 50 mg tablet Take 1 Tab by mouth daily for 3 days. Qty: 3 Tab, Refills: 0         CONTINUE these medications which have CHANGED    Details   albuterol (PROVENTIL HFA, VENTOLIN HFA, PROAIR HFA) 90 mcg/actuation inhaler Take 2 Puffs by inhalation every four (4) hours as needed for Wheezing or Cough for up to 14 days. Qty: 1 Inhaler, Refills: 0           2. Follow-up Information     Follow up With Specialties Details Why Contact Info    Eric Nair MD Family Medicine In 2 days  [place] [address] [address] [place] [address]  [phone]          Return to ED if worse     Diagnosis     Clinical Impression:   1.  Mild intermittent asthma with acute exacerbation

## 2020-10-25 NOTE — LETTER
[address] [place] [address] EMERGENCY DEPT 
[facility] [address] [place] [id number] 
[phone] Work/School Note Date: 10/25/2020 To Whom It May concern: Taryn Wiley was seen and treated today in the emergency room by the following provider(s): 
Nurse Practitioner: Severa Leash, NP. Taryn Wiley is excused from work/school on 10/25/20 and 10/26/20. She is medically clear to return to work/school on 10/27/2020.   
 
 
Sincerely, 
 
 
 
 
Saroj Dykes NP

## 2020-12-03 ENCOUNTER — VIRTUAL VISIT (OUTPATIENT)
Dept: FAMILY MEDICINE CLINIC | Age: 35
End: 2020-12-03
Payer: COMMERCIAL

## 2020-12-03 DIAGNOSIS — R42 VERTIGO: Primary | ICD-10-CM

## 2020-12-03 PROBLEM — R51.9 HEADACHE: Status: ACTIVE | Noted: 2020-09-29

## 2020-12-03 PROBLEM — Z87.820 HISTORY OF CONCUSSION: Status: ACTIVE | Noted: 2020-12-03

## 2020-12-03 PROBLEM — F41.9 ANXIETY: Status: ACTIVE | Noted: 2020-12-03

## 2020-12-03 PROBLEM — K02.9 DENTAL CARIES: Status: ACTIVE | Noted: 2020-12-03

## 2020-12-03 PROCEDURE — 99213 OFFICE O/P EST LOW 20 MIN: CPT | Performed by: NURSE PRACTITIONER

## 2020-12-03 RX ORDER — HYDROXYZINE PAMOATE 25 MG/1
CAPSULE ORAL
COMMUNITY
Start: 2020-09-18 | End: 2020-12-05

## 2020-12-03 RX ORDER — ALBUTEROL SULFATE 90 UG/1
AEROSOL, METERED RESPIRATORY (INHALATION)
COMMUNITY
Start: 2020-10-25 | End: 2021-02-24

## 2020-12-03 RX ORDER — ONDANSETRON 4 MG/1
TABLET, ORALLY DISINTEGRATING ORAL
COMMUNITY
End: 2020-12-08 | Stop reason: ALTCHOICE

## 2020-12-03 NOTE — PROGRESS NOTES
Chief Complaint   Patient presents with    Dizziness     There were no vitals taken for this visit. Subjective  Annel Holden is a 28 y.o. female. HPI- Pt presented for virtual appt via [employer]. Pt stated that she was seen at [facility] urgent care on 12/2/2020 w/ c/o of \"dizziness. \" for approx 2 weeks. She first experienced the symptoms when running on a treadmill. The symptoms happen when she bends over and other certain positions. Review of her urgent care note indicated that a differential was BPPV. Pt stated that she was told she had \"fluid on her L ear\". She is not having pain but has a \"tight\" feeling. Pt suffered a concussion in June 2020 and was referred to concussion clinic and had WC appts because she hit her head at her job. Was ordered meclizine and nausea medication as having some nausea w/ the vertigo. Pt has hx of anxiety and she is anxious over the vertigo. She already has an appt w/ and ENT provider on 12/4/2020. She has 3 day off work note from urgent care. Patient Active Problem List   Diagnosis Code    Iron deficiency anemia D50.9    Uterine leiomyoma D25.9    Headache R51.9    Anxiety F41.9    Dental caries K02.9    History of concussion Z87.820     Past Medical History:   Diagnosis Date    Anxiety     Asthma     Iron deficiency anemia 9/29/2020    Neck pain 9/29/2020    Uterine leiomyoma 9/29/2020     No past surgical history on file. Current Outpatient Medications   Medication Instructions    albuterol (PROVENTIL HFA, VENTOLIN HFA, PROAIR HFA) 90 mcg/actuation inhaler No dose, route, or frequency recorded.     diclofenac EC (VOLTAREN) 75 mg EC tablet Oral    hydrOXYzine HCL (ATARAX) 50 mg, Oral, 3 TIMES DAILY    hydrOXYzine pamoate (VISTARIL) 25 mg capsule TK 1 C PO UP TO TID PRA    ibuprofen (MOTRIN) 600 mg tablet Oral, EVERY 6 HOURS AS NEEDED    loratadine 10 mg cap Oral    meclizine (ANTIVERT) 12.5 mg tablet Oral, 3 TIMES DAILY AS NEEDED    ondansetron (ZOFRAN ODT) 4 mg disintegrating tablet ondansetron 4 mg disintegrating tablet   Place 1 tablet twice a day by translingual route as needed. No Known Allergies  Social History     Tobacco Use    Smoking status: Never Smoker    Smokeless tobacco: Never Used   Substance Use Topics    Alcohol use: Never     Frequency: Never    Drug use: Never     No family history on file. Review of Systems   Constitutional: Negative for chills and fever. HENT: Positive for sinus pain. Negative for ear pain and sore throat. Respiratory: Negative for cough, shortness of breath and wheezing. Cardiovascular: Negative for chest pain and palpitations. Gastrointestinal: Positive for nausea and vomiting. Negative for abdominal pain and diarrhea. Musculoskeletal: Negative for myalgias. Neurological: Positive for dizziness and headaches. Negative for tingling and sensory change. Psychiatric/Behavioral: The patient is nervous/anxious. Objective  Physical Exam  Constitutional:       General: She is not in acute distress. Appearance: Normal appearance. HENT:      Head: Normocephalic. Right Ear: External ear normal.      Left Ear: External ear normal.      Nose: Nose normal.   Eyes:      Conjunctiva/sclera: Conjunctivae normal.   Neck:      Musculoskeletal: Neck supple. Pulmonary:      Effort: Pulmonary effort is normal.   Musculoskeletal: Normal range of motion. Comments: Of visible extremities   Skin:     Coloration: Skin is not jaundiced. Neurological:      Mental Status: She is alert and oriented to person, place, and time. Psychiatric:         Behavior: Behavior normal.         Thought Content: Thought content normal.         Judgment: Judgment normal.      Comments: Somewhat anxious       Assessment & Plan      ICD-10-CM ICD-9-CM    1. Vertigo  R42 780.4          1. Vertigo  Pt to continue use of meclizine and zofran as needed. F/U ENT note and as needed.     I have discussed the diagnosis with the patient and the intended plan as seen in the above orders. Pt/caretaker has expressed understanding. Questions were answered concerning future plans. I have discussed medication side effects and warnings as indicated with the patient as well.     Andi Dinh, DIDIER

## 2020-12-05 ENCOUNTER — HOSPITAL ENCOUNTER (EMERGENCY)
Age: 35
Discharge: HOME | End: 2020-12-05
Attending: EMERGENCY MEDICINE
Payer: COMMERCIAL

## 2020-12-05 VITALS
DIASTOLIC BLOOD PRESSURE: 98 MMHG | WEIGHT: 175 LBS | HEART RATE: 109 BPM | TEMPERATURE: 98.2 F | SYSTOLIC BLOOD PRESSURE: 124 MMHG | HEIGHT: 68 IN | OXYGEN SATURATION: 100 % | RESPIRATION RATE: 16 BRPM | BODY MASS INDEX: 26.52 KG/M2

## 2020-12-05 DIAGNOSIS — F41.1 GENERALIZED ANXIETY DISORDER: ICD-10-CM

## 2020-12-05 DIAGNOSIS — G44.209 TENSION HEADACHE: Primary | ICD-10-CM

## 2020-12-05 PROCEDURE — 74011250637 HC RX REV CODE- 250/637: Performed by: EMERGENCY MEDICINE

## 2020-12-05 PROCEDURE — 99284 EMERGENCY DEPT VISIT MOD MDM: CPT

## 2020-12-05 RX ORDER — BUTALBITAL, ACETAMINOPHEN AND CAFFEINE 300; 40; 50 MG/1; MG/1; MG/1
1 CAPSULE ORAL
Qty: 15 CAP | Refills: 0 | Status: SHIPPED | OUTPATIENT
Start: 2020-12-05 | End: 2021-02-24

## 2020-12-05 RX ORDER — BUTALBITAL, ACETAMINOPHEN AND CAFFEINE 50; 325; 40 MG/1; MG/1; MG/1
1 TABLET ORAL
Status: COMPLETED | OUTPATIENT
Start: 2020-12-05 | End: 2020-12-05

## 2020-12-05 RX ADMIN — BUTALBITAL, ACETAMINOPHEN, AND CAFFEINE 1 TABLET: 50; 325; 40 TABLET ORAL at 17:33

## 2020-12-05 NOTE — DISCHARGE INSTRUCTIONS
Take medicines as prescribed and return to the emergency room for any new or worsening symptoms. Continue all your home medications.

## 2020-12-05 NOTE — ED TRIAGE NOTES
Pt c/o feeling pressure in her head, muscle tightness in her shoulders for 10 days. Seen by ENT for dizziness and work up negative per patient. Pt thinks this may be her anxiety. Wants to be checked for a tumor or stroke.

## 2020-12-05 NOTE — ED PROVIDER NOTES
EMERGENCY DEPARTMENT HISTORY AND PHYSICAL EXAM      Date: 12/5/2020  Patient Name: Roberto Morel    History of Presenting Illness     Chief Complaint   Patient presents with    Headache    Anxiety       History Provided By: Patient    HPI: Roberto Morel, 28 y.o. female with a past medical history significant asthma and Fibroids, chronic neck pain, iron deficiency anemia and anxiety presents to the ED with cc of tension headache of insidious onset over days duration. Patient also has generalized anxiety due to the headache. No fever, body aches, cough or acute shortness of breath. No other associated symptomatology. There are no other complaints, changes, or physical findings at this time. PCP: Yuko Kaufman MD    No current facility-administered medications on file prior to encounter. Current Outpatient Medications on File Prior to Encounter   Medication Sig Dispense Refill    albuterol (PROVENTIL HFA, VENTOLIN HFA, PROAIR HFA) 90 mcg/actuation inhaler       ondansetron (ZOFRAN ODT) 4 mg disintegrating tablet ondansetron 4 mg disintegrating tablet   Place 1 tablet twice a day by translingual route as needed.  [DISCONTINUED] hydrOXYzine pamoate (VISTARIL) 25 mg capsule TK 1 C PO UP TO TID PRA      ibuprofen (MOTRIN) 600 mg tablet Take  by mouth every six (6) hours as needed for Pain.  diclofenac EC (VOLTAREN) 75 mg EC tablet Take  by mouth.  meclizine (ANTIVERT) 12.5 mg tablet Take  by mouth three (3) times daily as needed for Dizziness.  [DISCONTINUED] loratadine 10 mg cap Take  by mouth.  hydrOXYzine HCL (ATARAX) 50 mg tablet Take 50 mg by mouth three (3) times daily. Past History     Past Medical History:  Past Medical History:   Diagnosis Date    Anxiety     Asthma     Iron deficiency anemia 9/29/2020    Neck pain 9/29/2020    Uterine leiomyoma 9/29/2020       Past Surgical History:  No past surgical history on file.     Family History:  No family history on file. Social History:  Social History     Tobacco Use    Smoking status: Never Smoker    Smokeless tobacco: Never Used   Substance Use Topics    Alcohol use: Never     Frequency: Never    Drug use: Never       Allergies:  No Known Allergies      Review of Systems     Review of Systems   Constitutional: Negative. HENT: Negative. Eyes: Negative. Respiratory: Negative. Cardiovascular: Negative. Gastrointestinal: Negative. Endocrine: Negative. Genitourinary: Negative. Musculoskeletal: Negative. Skin: Negative. Neurological: Positive for headaches. Hematological: Negative. Psychiatric/Behavioral: Negative. All other systems reviewed and are negative. Physical Exam     Physical Exam  Vitals signs and nursing note reviewed. Constitutional:       Appearance: Normal appearance. She is normal weight. HENT:      Head: Normocephalic and atraumatic. Nose: Nose normal.      Mouth/Throat:      Mouth: Mucous membranes are moist.      Pharynx: Oropharynx is clear. Eyes:      Extraocular Movements: Extraocular movements intact. Conjunctiva/sclera: Conjunctivae normal.      Pupils: Pupils are equal, round, and reactive to light. Neck:      Musculoskeletal: Normal range of motion and neck supple. Cardiovascular:      Rate and Rhythm: Normal rate and regular rhythm. Pulses: Normal pulses. Heart sounds: Normal heart sounds. Pulmonary:      Effort: Pulmonary effort is normal.      Breath sounds: Normal breath sounds. Abdominal:      General: Abdomen is flat. Palpations: Abdomen is soft. Musculoskeletal: Normal range of motion. Skin:     General: Skin is warm and dry. Capillary Refill: Capillary refill takes less than 2 seconds. Neurological:      General: No focal deficit present. Mental Status: She is alert and oriented to person, place, and time.    Psychiatric:         Mood and Affect: Mood normal.         Behavior: Behavior normal.         Lab and Diagnostic Study Results     Labs -   No results found for this or any previous visit (from the past 12 hour(s)). Radiologic Studies -     CT Results  (Last 48 hours)    None        CXR Results  (Last 48 hours)    None            Medical Decision Making     - I am the first provider for this patient. - I reviewed the vital signs, available nursing notes, past medical history, past surgical history, family history and social history. - Initial assessment performed. The patients presenting problems have been discussed, and they are in agreement with the care plan formulated and outlined with them. I have encouraged them to ask questions as they arise throughout their visit. Vital Signs-Reviewed the patient's vital signs. Patient Vitals for the past 12 hrs:   Temp Pulse Resp BP SpO2   12/05/20 1634 98.2 °F (36.8 °C) (!) 109 16 (!) 124/98 100 %       Records Reviewed: Nursing Notes    ED Course/Provider Notes (Medical Decision Making): Uneventful ED course, clinical improvement with therapy, patient will be discharged to followup with PCP as directed    Disposition     Disposition: Condition stable and improved  DC- Adult Discharges: All of the diagnostic tests were reviewed and questions answered. Diagnosis, care plan and treatment options were discussed. The patient understands the instructions and will follow up as directed. The patients results have been reviewed with them. They have been counseled regarding their diagnosis. The patient verbally convey understanding and agreement of the signs, symptoms, diagnosis, treatment and prognosis and additionally agrees to follow up as recommended with their PCP in 24 - 48 hours. They also agree with the care-plan and convey that all of their questions have been answered.   I have also put together some discharge instructions for them that include: 1) educational information regarding their diagnosis, 2) how to care for their diagnosis at home, as well a 3) list of reasons why they would want to return to the ED prior to their follow-up appointment, should their condition change. Discharged    DISCHARGE PLAN:  1. Current Discharge Medication List      CONTINUE these medications which have NOT CHANGED    Details   albuterol (PROVENTIL HFA, VENTOLIN HFA, PROAIR HFA) 90 mcg/actuation inhaler       ondansetron (ZOFRAN ODT) 4 mg disintegrating tablet ondansetron 4 mg disintegrating tablet   Place 1 tablet twice a day by translingual route as needed. ibuprofen (MOTRIN) 600 mg tablet Take  by mouth every six (6) hours as needed for Pain. diclofenac EC (VOLTAREN) 75 mg EC tablet Take  by mouth.      meclizine (ANTIVERT) 12.5 mg tablet Take  by mouth three (3) times daily as needed for Dizziness. hydrOXYzine HCL (ATARAX) 50 mg tablet Take 50 mg by mouth three (3) times daily. 2.   Follow-up Information     Follow up With Specialties Details Why Contact Info    Kieran Elaine MD Family Medicine In 3 days As needed [facility] [address] [facility]  [phone]          3. Return to ED if worse   4.    Discharge Medication List as of 12/5/2020  5:34 PM      START taking these medications    Details   butalbital-acetaminophen-caff (Fioricet) -40 mg per capsule Take 1 Cap by mouth every four (4) hours as needed for Headache., Normal, Disp-15 Cap,R-0         CONTINUE these medications which have NOT CHANGED    Details   albuterol (PROVENTIL HFA, VENTOLIN HFA, PROAIR HFA) 90 mcg/actuation inhaler Historical Med      ondansetron (ZOFRAN ODT) 4 mg disintegrating tablet ondansetron 4 mg disintegrating tablet   Place 1 tablet twice a day by translingual route as needed., Historical Med      ibuprofen (MOTRIN) 600 mg tablet Take  by mouth every six (6) hours as needed for Pain., Historical Med      diclofenac EC (VOLTAREN) 75 mg EC tablet Take  by mouth., Historical Med      meclizine (ANTIVERT) 12.5 mg tablet Take  by mouth three (3) times daily as needed for Dizziness. , Historical Med      hydrOXYzine HCL (ATARAX) 50 mg tablet Take 50 mg by mouth three (3) times daily. , Historical Med               Diagnosis     Clinical Impression:   1. Tension headache    2. Generalized anxiety disorder        Attestations:    Karla Adorno MD    Please note that this dictation was completed with [employer], the computer voice recognition software. Quite often unanticipated grammatical, syntax, homophones, and other interpretive errors are inadvertently transcribed by the computer software. Please disregard these errors. Please excuse any errors that have escaped final proofreading. Thank you.

## 2020-12-08 ENCOUNTER — OFFICE VISIT (OUTPATIENT)
Dept: FAMILY MEDICINE CLINIC | Age: 35
End: 2020-12-08
Payer: COMMERCIAL

## 2020-12-08 VITALS
WEIGHT: 171 LBS | DIASTOLIC BLOOD PRESSURE: 87 MMHG | RESPIRATION RATE: 18 BRPM | SYSTOLIC BLOOD PRESSURE: 132 MMHG | HEART RATE: 114 BPM | HEIGHT: 68 IN | TEMPERATURE: 98 F | BODY MASS INDEX: 25.91 KG/M2 | OXYGEN SATURATION: 98 %

## 2020-12-08 DIAGNOSIS — R42 VERTIGO: ICD-10-CM

## 2020-12-08 DIAGNOSIS — M54.2 NECK PAIN: Primary | ICD-10-CM

## 2020-12-08 PROCEDURE — 99213 OFFICE O/P EST LOW 20 MIN: CPT | Performed by: NURSE PRACTITIONER

## 2020-12-08 RX ORDER — MELOXICAM 7.5 MG/1
7.5 TABLET ORAL DAILY
COMMUNITY
Start: 2020-12-04 | End: 2020-12-08 | Stop reason: ALTCHOICE

## 2020-12-08 RX ORDER — PREDNISONE 10 MG/1
TABLET ORAL
COMMUNITY
Start: 2020-12-07 | End: 2021-02-24

## 2020-12-08 NOTE — PROGRESS NOTES
Chief Complaint   Patient presents with    Physical    Follow-up     follow up from ENT    Neck Pain     Visit Vitals  /87 (BP 1 Location: Right arm, BP Patient Position: Sitting)   Pulse (!) 114   Temp 98 °F (36.7 °C) (Oral)   Resp 18   Ht 5' 8\" (1.727 m)   Wt 171 lb (77.6 kg)   LMP 11/17/2020   SpO2 98%   BMI 26.00 kg/m²     Subjective  Eliza Sethi is a 28 y.o. female. HPI- Pt seen today for follow-up after ER visit on 12/5/2020 for headache and anxiety. Recent history in brief:  Patient was seen at [facility] urgent care on 12/2/2020 w/ c/o of \"dizziness. \" for approx 2 weeks. She first experienced the symptoms when running on a treadmill. The symptoms happened when she bent over and other certain positions. Review of her urgent care note indicated that a differential was BPPV. Pt stated that she was told she had \"fluid on her L ear\"  Pt suffered a concussion in June 2020 and was referred to concussion clinic and had WC appts because she hit her head at her job. Has been cleared from the concussion clinic. None the less, pt is fearful that the dizziness symptoms are caused by the concussion. Then seen for virtual visit by me on 12/3/2020 with complaints of dizziness. She also had an appointment with ENT on 12/4/2020 for same complaints. She stated today that ENT provider did not think she had vertigo and physical exam was unremarkable. She went to emergency room again on 12/5/2020 with complaints of headache and neck pain. She was told to f/u w/ PCP which is why she is here today. She is quite anxious about the symptoms and no amount of reassurance seems to be sufficient. CT done in September 2020 after she hit her head at work was unremarkable. Also seeing ortho and having PT since Aug/Sept 2020 for  \"tight\" feeling in her entire L arm. Has PT once weekly. Appointment with Ortho provider was 12/4/2020. Did a steroid pack for neck. Was having a pulling sensation in the neck. Sometimes she is still scared to move her neck. She has anxiety. Having dizziness randomly. She stated she was having dizziness while she was sitting talking to me. She sees a psychiatrist about every 3 months for anxiety- not currently on anxiety medication. Patient Active Problem List   Diagnosis Code    Iron deficiency anemia D50.9    Uterine leiomyoma D25.9    Headache R51.9    Anxiety F41.9    Dental caries K02.9    History of concussion Z87.820     Past Medical History:   Diagnosis Date    Anxiety     Asthma     Iron deficiency anemia 9/29/2020    Neck pain 9/29/2020    Uterine leiomyoma 9/29/2020     History reviewed. No pertinent surgical history. Current Outpatient Medications   Medication Instructions    albuterol (PROVENTIL HFA, VENTOLIN HFA, PROAIR HFA) 90 mcg/actuation inhaler No dose, route, or frequency recorded.  butalbital-acetaminophen-caff (Fioricet) -40 mg per capsule 1 Cap, Oral, EVERY 4 HOURS AS NEEDED    predniSONE (STERAPRED DS) 10 mg dose pack Take as directed per package insert     No Known Allergies  Social History     Tobacco Use    Smoking status: Never Smoker    Smokeless tobacco: Never Used   Substance Use Topics    Alcohol use: Never     Frequency: Never    Drug use: Never     Family History   Problem Relation Age of Onset    Hypertension Mother      Review of Systems   Constitutional: Negative for chills and fever. HENT: Negative for ear pain, sore throat and tinnitus. Eyes: Negative for blurred vision and double vision. Respiratory: Negative for cough, shortness of breath and wheezing. Cardiovascular: Negative for chest pain and palpitations. Gastrointestinal: Negative for nausea and vomiting. Musculoskeletal: Positive for back pain. Neurological: Positive for dizziness and headaches. Negative for tingling and sensory change. Having tension across forehead and to both temples.    Psychiatric/Behavioral: The patient is nervous/anxious. Objective  Physical Exam  HENT:      Head: Normocephalic. Right Ear: External ear normal.      Left Ear: External ear normal.      Nose: Nose normal.   Eyes:      Conjunctiva/sclera: Conjunctivae normal.   Neck:      Musculoskeletal: Neck supple. Cardiovascular:      Rate and Rhythm: Normal rate and regular rhythm. Heart sounds: Normal heart sounds. No murmur. Pulmonary:      Effort: Pulmonary effort is normal.      Breath sounds: Normal breath sounds. Musculoskeletal: Normal range of motion. General: Tenderness present. No swelling. Right lower leg: No edema. Left lower leg: No edema. Comments: Mild TTP cervical spine area. Mild pain w/ ROM of neck. No swelling or warmth noted. Skin:     General: Skin is warm and dry. Coloration: Skin is not jaundiced. Neurological:      Mental Status: She is alert and oriented to person, place, and time. Psychiatric:         Behavior: Behavior normal.         Thought Content: Thought content normal.      Comments: anxiety       Assessment & Plan      ICD-10-CM ICD-9-CM    1. Neck pain  M54.2 723.1    2. Vertigo  R42 780.4        1. Neck pain  Advised to try heat to her neck, OTC antiinflammatories and do exercises recommended by PT regularly at home between her appts. 2. Vertigo  Etiology unclear- eval by ENT was not illuminating. Continue to monitor- may be an anxiety symptom. I have discussed the diagnosis with the patient and the intended plan as seen in the above orders. Pt/caretaker has expressed understanding. Questions were answered concerning future plans. I have discussed medication side effects and warnings as indicated with the patient as well.     Anita Hernandez NP

## 2020-12-12 ENCOUNTER — HOSPITAL ENCOUNTER (EMERGENCY)
Age: 35
Discharge: HOME | End: 2020-12-12
Payer: COMMERCIAL

## 2020-12-12 VITALS
WEIGHT: 171 LBS | DIASTOLIC BLOOD PRESSURE: 99 MMHG | HEART RATE: 85 BPM | BODY MASS INDEX: 25.91 KG/M2 | SYSTOLIC BLOOD PRESSURE: 146 MMHG | OXYGEN SATURATION: 100 % | HEIGHT: 68 IN | TEMPERATURE: 98.7 F | RESPIRATION RATE: 16 BRPM

## 2020-12-12 DIAGNOSIS — M54.2 NECK PAIN: Primary | ICD-10-CM

## 2020-12-12 LAB
ALBUMIN SERPL-MCNC: 3.5 G/DL (ref 3.5–5)
ALBUMIN/GLOB SERPL: 0.9 {RATIO} (ref 1.1–2.2)
ALP SERPL-CCNC: 51 U/L (ref 45–117)
ALT SERPL-CCNC: 28 U/L (ref 12–78)
ANION GAP SERPL CALC-SCNC: 3 MMOL/L (ref 5–15)
AST SERPL W P-5'-P-CCNC: 18 U/L (ref 15–37)
BASOPHILS # BLD: 0 K/UL (ref 0–0.1)
BASOPHILS NFR BLD: 0 % (ref 0–1)
BILIRUB SERPL-MCNC: 0.3 MG/DL (ref 0.2–1)
BUN SERPL-MCNC: 11 MG/DL (ref 6–20)
BUN/CREAT SERPL: 16 (ref 12–20)
CA-I BLD-MCNC: 9 MG/DL (ref 8.5–10.1)
CHLORIDE SERPL-SCNC: 108 MMOL/L (ref 97–108)
CO2 SERPL-SCNC: 28 MMOL/L (ref 21–32)
CREAT SERPL-MCNC: 0.69 MG/DL (ref 0.55–1.02)
DIFFERENTIAL METHOD BLD: ABNORMAL
EOSINOPHIL # BLD: 0 K/UL (ref 0–0.4)
EOSINOPHIL NFR BLD: 1 % (ref 0–7)
ERYTHROCYTE [DISTWIDTH] IN BLOOD BY AUTOMATED COUNT: 13.2 % (ref 11.5–14.5)
GLOBULIN SER CALC-MCNC: 4.1 G/DL (ref 2–4)
GLUCOSE SERPL-MCNC: 92 MG/DL (ref 65–100)
HCT VFR BLD AUTO: 39.1 % (ref 35–47)
HGB BLD-MCNC: 12.8 G/DL (ref 11.5–16)
IMM GRANULOCYTES # BLD AUTO: 0 K/UL (ref 0–0.04)
IMM GRANULOCYTES NFR BLD AUTO: 0 % (ref 0–0.5)
LYMPHOCYTES # BLD: 2.5 K/UL (ref 0.8–3.5)
LYMPHOCYTES NFR BLD: 53 % (ref 12–49)
MCH RBC QN AUTO: 29.7 PG (ref 26–34)
MCHC RBC AUTO-ENTMCNC: 32.7 G/DL (ref 30–36.5)
MCV RBC AUTO: 90.7 FL (ref 80–99)
MONOCYTES # BLD: 0.5 K/UL (ref 0–1)
MONOCYTES NFR BLD: 10 % (ref 5–13)
NEUTS SEG # BLD: 1.7 K/UL (ref 1.8–8)
NEUTS SEG NFR BLD: 36 % (ref 32–75)
PLATELET # BLD AUTO: 272 K/UL (ref 150–400)
PMV BLD AUTO: 10.9 FL (ref 8.9–12.9)
POTASSIUM SERPL-SCNC: 4 MMOL/L (ref 3.5–5.1)
PROT SERPL-MCNC: 7.6 G/DL (ref 6.4–8.2)
RBC # BLD AUTO: 4.31 M/UL (ref 3.8–5.2)
SODIUM SERPL-SCNC: 139 MMOL/L (ref 136–145)
WBC # BLD AUTO: 4.6 K/UL (ref 3.6–11)

## 2020-12-12 PROCEDURE — 80053 COMPREHEN METABOLIC PANEL: CPT

## 2020-12-12 PROCEDURE — 74011250636 HC RX REV CODE- 250/636: Performed by: PHYSICIAN ASSISTANT

## 2020-12-12 PROCEDURE — 36415 COLL VENOUS BLD VENIPUNCTURE: CPT

## 2020-12-12 PROCEDURE — 99283 EMERGENCY DEPT VISIT LOW MDM: CPT

## 2020-12-12 PROCEDURE — 85025 COMPLETE CBC W/AUTO DIFF WBC: CPT

## 2020-12-12 RX ADMIN — SODIUM CHLORIDE 1000 ML: 9 INJECTION, SOLUTION INTRAVENOUS at 10:24

## 2020-12-12 NOTE — ED TRIAGE NOTES
Pt complains of neck pain and vertigo  Saw ortho this week and has MRI scheduled for 12/24/20  Pt states she wants the MRI today  Pt has been seen 7 times since December 2, 2020

## 2020-12-12 NOTE — ED PROVIDER NOTES
EMERGENCY DEPARTMENT HISTORY AND PHYSICAL EXAM      Date: 12/12/2020  Patient Name: Bhupinder Venegas    History of Presenting Illness     Chief Complaint   Patient presents with    Neck Pain       History Provided By: Patient    HPI: Bhupinder Venegas, 28 y.o. female with a past medical history significant for anxiety,chronic cervical pain and dizzinees who presents to the ED with cc of chronic neck pain and dizziness since this am.Pt states that when she got up this am she had very mild dizziness that only lasted for a few seconds which was relieved when she sat down. Pt denies syncope,n/v/d or abdominal pain. Pt has been seen multiple times in the ED for same complaint and is scheduled for a cervical MRI in 2 weeks. There are no other complaints, changes, or physical findings at this time. PCP: Miki Bullock MD    No current facility-administered medications on file prior to encounter. Current Outpatient Medications on File Prior to Encounter   Medication Sig Dispense Refill    predniSONE (STERAPRED DS) 10 mg dose pack Take as directed per package insert      butalbital-acetaminophen-caff (Fioricet) -40 mg per capsule Take 1 Cap by mouth every four (4) hours as needed for Headache. 15 Cap 0    albuterol (PROVENTIL HFA, VENTOLIN HFA, PROAIR HFA) 90 mcg/actuation inhaler          Past History     Past Medical History:  Past Medical History:   Diagnosis Date    Anxiety     Asthma     Iron deficiency anemia 9/29/2020    Neck pain 9/29/2020    Uterine leiomyoma 9/29/2020       Past Surgical History:  No past surgical history on file.     Family History:  Family History   Problem Relation Age of Onset    Hypertension Mother        Social History:  Social History     Tobacco Use    Smoking status: Never Smoker    Smokeless tobacco: Never Used   Substance Use Topics    Alcohol use: Never     Frequency: Never    Drug use: Never       Allergies:  No Known Allergies      Review of Systems     Review of Systems   Constitutional: Negative. HENT: Negative. Eyes: Negative. Respiratory: Negative. Cardiovascular: Negative. Gastrointestinal: Negative. Endocrine: Negative. Genitourinary: Negative. Musculoskeletal: Positive for neck pain. Skin: Negative. Allergic/Immunologic: Negative. Neurological: Positive for dizziness. Hematological: Negative. Psychiatric/Behavioral: Negative. Physical Exam     Physical Exam  Vitals signs and nursing note reviewed. Constitutional:       Appearance: Normal appearance. She is normal weight. Eyes:      Extraocular Movements: Extraocular movements intact. Pupils: Pupils are equal, round, and reactive to light. Neck:      Musculoskeletal: Neck supple. Comments: + ttp cervical spine with mild pain on F-ROM   Cardiovascular:      Rate and Rhythm: Normal rate and regular rhythm. Pulmonary:      Effort: Pulmonary effort is normal.      Breath sounds: Normal breath sounds. Musculoskeletal: Normal range of motion. Skin:     General: Skin is dry. Neurological:      General: No focal deficit present. Mental Status: She is alert and oriented to person, place, and time. Mental status is at baseline.    Psychiatric:         Mood and Affect: Mood normal.         Behavior: Behavior normal.      Comments: Mild anxiety          Lab and Diagnostic Study Results     Labs -     Recent Results (from the past 12 hour(s))   CBC WITH AUTOMATED DIFF    Collection Time: 12/12/20  9:10 AM   Result Value Ref Range    WBC 4.6 3.6 - 11.0 K/uL    RBC 4.31 3.80 - 5.20 M/uL    HGB 12.8 11.5 - 16.0 g/dL    HCT 39.1 35.0 - 47.0 %    MCV 90.7 80.0 - 99.0 FL    MCH 29.7 26.0 - 34.0 PG    MCHC 32.7 30.0 - 36.5 g/dL    RDW 13.2 11.5 - 14.5 %    PLATELET 225 156 - 310 K/uL    MPV 10.9 8.9 - 12.9 FL    NEUTROPHILS 36 32 - 75 %    LYMPHOCYTES 53 (H) 12 - 49 %    MONOCYTES 10 5 - 13 %    EOSINOPHILS 1 0 - 7 %    BASOPHILS 0 0 - 1 %    IMMATURE GRANULOCYTES 0 0.0 - 0.5 %    ABS. NEUTROPHILS 1.7 (L) 1.8 - 8.0 K/UL    ABS. LYMPHOCYTES 2.5 0.8 - 3.5 K/UL    ABS. MONOCYTES 0.5 0.0 - 1.0 K/UL    ABS. EOSINOPHILS 0.0 0.0 - 0.4 K/UL    ABS. BASOPHILS 0.0 0.0 - 0.1 K/UL    ABS. IMM. GRANS. 0.0 0.00 - 0.04 K/UL    DF AUTOMATED     METABOLIC PANEL, COMPREHENSIVE    Collection Time: 12/12/20  9:10 AM   Result Value Ref Range    Sodium 139 136 - 145 mmol/L    Potassium 4.0 3.5 - 5.1 mmol/L    Chloride 108 97 - 108 mmol/L    CO2 28 21 - 32 mmol/L    Anion gap 3 (L) 5 - 15 mmol/L    Glucose 92 65 - 100 mg/dL    BUN 11 6 - 20 mg/dL    Creatinine 0.69 0.55 - 1.02 mg/dL    BUN/Creatinine ratio 16 12 - 20      GFR est AA >60 >60 ml/min/1.73m2    GFR est non-AA >60 >60 ml/min/1.73m2    Calcium 9.0 8.5 - 10.1 mg/dL    Bilirubin, total 0.3 0.2 - 1.0 mg/dL    AST (SGOT) 18 15 - 37 U/L    ALT (SGPT) 28 12 - 78 U/L    Alk. phosphatase 51 45 - 117 U/L    Protein, total 7.6 6.4 - 8.2 g/dL    Albumin 3.5 3.5 - 5.0 g/dL    Globulin 4.1 (H) 2.0 - 4.0 g/dL    A-G Ratio 0.9 (L) 1.1 - 2.2         Radiologic Studies -   @lastxrresult@  CT Results  (Last 48 hours)    None        CXR Results  (Last 48 hours)    None            Medical Decision Making   - I am the first provider for this patient. - I reviewed the vital signs, available nursing notes, past medical history, past surgical history, family history and social history. - Initial assessment performed. The patients presenting problems have been discussed, and they are in agreement with the care plan formulated and outlined with them. I have encouraged them to ask questions as they arise throughout their visit. Vital Signs-Reviewed the patient's vital signs.   Patient Vitals for the past 12 hrs:   Temp Pulse Resp BP SpO2   12/12/20 1213 98.7 °F (37.1 °C) 85 16 (!) 146/99 100 %   12/12/20 0908  84  131/80    12/12/20 0826 99.3 °F (37.4 °C) 94 17 (!) 141/80 96 %       Records Reviewed: Nursing Notes  The patient presents with a differential diagnosis of chronic dizziness and cervical pain. ED Course:          Provider Notes (Medical Decision Making):     MDM   Order cbc,bmp and get orthostatic BPs    Procedures   Medical Decision Makingedical Decision Making  Performed by: MARSHA Granger    Procedures       Disposition   Disposition: DC- Adult Discharges: All of the diagnostic tests were reviewed and questions answered. Diagnosis, care plan and treatment options were discussed. The patient understands the instructions and will follow up as directed. The patients results have been reviewed with them. They have been counseled regarding their diagnosis. The patient verbally convey understanding and agreement of the signs, symptoms, diagnosis, treatment and prognosis and additionally agrees to follow up as recommended with their PCP in 24 - 48 hours. They also agree with the care-plan and convey that all of their questions have been answered. I have also put together some discharge instructions for them that include: 1) educational information regarding their diagnosis, 2) how to care for their diagnosis at home, as well a 3) list of reasons why they would want to return to the ED prior to their follow-up appointment, should their condition change. Discharged    DISCHARGE PLAN:  1. Current Discharge Medication List      CONTINUE these medications which have NOT CHANGED    Details   predniSONE (STERAPRED DS) 10 mg dose pack Take as directed per package insert      butalbital-acetaminophen-caff (Fioricet) -40 mg per capsule Take 1 Cap by mouth every four (4) hours as needed for Headache.   Qty: 15 Cap, Refills: 0      albuterol (PROVENTIL HFA, VENTOLIN HFA, PROAIR HFA) 90 mcg/actuation inhaler            2.   Follow-up Information     Follow up With Specialties Details Why Contact Info    Gissell Flannery MD Family Medicine In 2 days As needed, If symptoms worsen [facility] [place] [address] [facility] [address]  [phone]          3. Return to ED if worse   4. Current Discharge Medication List            Diagnosis     Clinical Impression:   1. Neck pain        Attestations:    MARSHA Ruelas    Please note that this dictation was completed with [employer], the computer voice recognition software. Quite often unanticipated grammatical, syntax, homophones, and other interpretive errors are inadvertently transcribed by the computer software. Please disregard these errors. Please excuse any errors that have escaped final proofreading. Thank you.

## 2020-12-21 NOTE — PROGRESS NOTES
I  do attest that this note was reviewed and I was available for  DIDIER PARHAM in this day of service and will follow along with DIDIER Jacques.      Leia Peabody, DO

## 2020-12-22 NOTE — PROGRESS NOTES
I  do attest that this note was reviewed and I was available for  DIDIER PARHAM in this day of service and will follow along with DIDIER Rothman.      Belen Iqbal, DO

## 2021-01-06 ENCOUNTER — APPOINTMENT (OUTPATIENT)
Dept: NON INVASIVE DIAGNOSTICS | Age: 36
End: 2021-01-06
Attending: EMERGENCY MEDICINE
Payer: COMMERCIAL

## 2021-01-06 ENCOUNTER — HOSPITAL ENCOUNTER (EMERGENCY)
Age: 36
Discharge: HOME | End: 2021-01-06
Payer: COMMERCIAL

## 2021-01-06 VITALS
OXYGEN SATURATION: 100 % | SYSTOLIC BLOOD PRESSURE: 125 MMHG | HEART RATE: 102 BPM | WEIGHT: 174 LBS | RESPIRATION RATE: 16 BRPM | DIASTOLIC BLOOD PRESSURE: 82 MMHG | HEIGHT: 68 IN | BODY MASS INDEX: 26.37 KG/M2 | TEMPERATURE: 99.2 F

## 2021-01-06 DIAGNOSIS — M79.604 RIGHT LEG PAIN: Primary | ICD-10-CM

## 2021-01-06 PROCEDURE — 93971 EXTREMITY STUDY: CPT

## 2021-01-06 PROCEDURE — 99283 EMERGENCY DEPT VISIT LOW MDM: CPT

## 2021-01-06 NOTE — ED TRIAGE NOTES
Pt was dx with COVID 12/30/20, over the last few days pt has right leg pain, sent from [facility] for doppler to rule out DVT

## 2021-01-07 NOTE — ED PROVIDER NOTES
EMERGENCY DEPARTMENT HISTORY AND PHYSICAL EXAM      Date: 1/6/2021  Patient Name: Dewey Lugo    History of Presenting Illness     Chief Complaint   Patient presents with    Leg Pain       History Provided By: Patient    HPI: Dewey Lugo, 28 y.o. female with a past medical history significant for asthma, anxiety, and iron deficiency anemia who presents to the ED with cc of intermittent, achy pain in the right calf x2 to 3 days. Symptoms exacerbated with ambulation. No particular alleviating factors. Patient recently tested positive for COVID-19 and [facility] entered to [facility] ED for DVT rule out. Patient has no history of PE/DVT, coagulopathies. No recent long distance travel, recent surgery, hormone use. States she typically works as an [employer]  and walks 10,000 steps a day. Patient denies fever, chills, chest pain, shortness of breath, nausea, vomiting, diarrhea, hemoptysis, leg swelling. No recent trauma, injury, fall. There are no other complaints, changes, or physical findings at this time. PCP: Radha Cleveland MD    No current facility-administered medications on file prior to encounter. Current Outpatient Medications on File Prior to Encounter   Medication Sig Dispense Refill    predniSONE (STERAPRED DS) 10 mg dose pack Take as directed per package insert      butalbital-acetaminophen-caff (Fioricet) -40 mg per capsule Take 1 Cap by mouth every four (4) hours as needed for Headache. 15 Cap 0    albuterol (PROVENTIL HFA, VENTOLIN HFA, PROAIR HFA) 90 mcg/actuation inhaler          Past History     Past Medical History:  Past Medical History:   Diagnosis Date    Anxiety     Asthma     Iron deficiency anemia 9/29/2020    Neck pain 9/29/2020    Uterine leiomyoma 9/29/2020       Past Surgical History:  No past surgical history on file.     Family History:  Family History   Problem Relation Age of Onset    Hypertension Mother        Social History:  Social History     Tobacco Use    Smoking status: Never Smoker    Smokeless tobacco: Never Used   Substance Use Topics    Alcohol use: Never     Frequency: Never    Drug use: Never       Allergies:  No Known Allergies      Review of Systems     Review of Systems   Constitutional: Negative for chills, fatigue and fever. HENT: Negative. Respiratory: Negative for cough, chest tightness, shortness of breath and wheezing. Cardiovascular: Negative for chest pain and palpitations. Gastrointestinal: Negative for abdominal pain, diarrhea, nausea and vomiting. Genitourinary: Negative for frequency and urgency. Musculoskeletal: Positive for myalgias (R calf). Negative for back pain, neck pain and neck stiffness. Skin: Negative for rash. Neurological: Negative for dizziness, weakness, light-headedness and headaches. Psychiatric/Behavioral: Negative. All other systems reviewed and are negative. Physical Exam     Physical Exam  Vitals signs and nursing note reviewed. Constitutional:       General: She is not in acute distress. Appearance: Normal appearance. She is not ill-appearing or toxic-appearing. Comments: Pleasant thin AA female   HENT:      Head: Normocephalic and atraumatic. Nose: Nose normal.      Mouth/Throat:      Mouth: Mucous membranes are moist.   Eyes:      General: Lids are normal.      Conjunctiva/sclera:      Right eye: Right conjunctiva is not injected. Left eye: Left conjunctiva is not injected. Neck:      Musculoskeletal: Normal range of motion and neck supple. Cardiovascular:      Rate and Rhythm: Normal rate and regular rhythm. Heart sounds: No murmur. No friction rub. No gallop. Pulmonary:      Effort: Pulmonary effort is normal. No tachypnea or respiratory distress. Breath sounds: Normal breath sounds. No stridor or decreased air movement. Musculoskeletal: Normal range of motion.          General: No swelling, tenderness, deformity or signs of injury.      Right lower leg: No edema.      Left lower leg: No edema.      Comments: Nontender R calf. No palpable cord. 2+ DP pulses bilaterally, sensation intact distally. No lower extremity edema bilaterally   Skin:     General: Skin is warm.      Capillary Refill: Capillary refill takes less than 2 seconds.   Neurological:      General: No focal deficit present.      Mental Status: She is alert and oriented to person, place, and time. Mental status is at baseline.   Psychiatric:         Mood and Affect: Mood normal.         Behavior: Behavior is cooperative.         Thought Content: Thought content normal.         Judgment: Judgment normal.         Lab and Diagnostic Study Results     Labs -   No results found for this or any previous visit (from the past 12 hour(s)).    Radiologic Studies -   Lower Extremity Venous Findings    Right Lower Venous    No evidence of deep vein thrombosis in the common femoral, profunda femoral, femoral, popliteal, posterior tibial, and peroneal veins.  The veins were imaged in the transverse and longitudinal planes. The vessels showed normal color filling and compressibility. Doppler interrogation showed phasic and spontaneous flow.         Medical Decision Making   - I am the first provider for this patient.    - I reviewed the vital signs, available nursing notes, past medical history, past surgical history, family history and social history.    - Initial assessment performed. The patients presenting problems have been discussed, and they are in agreement with the care plan formulated and outlined with them.  I have encouraged them to ask questions as they arise throughout their visit.    Vital Signs-Reviewed the patient's vital signs.  Patient Vitals for the past 12 hrs:   Temp Pulse Resp BP SpO2   01/06/21 1845 99.5 °F (37.5 °C) (!) 109 15 129/80 99 %       Records Reviewed: Nursing Notes    The patient presents with R leg pain with a differential diagnosis of DVT, calf  strain, contusion      ED Course:     ED Course as of Jan 06 2049 Wed Jan 06, 2021 2018 Informed by vascular technician that patient had negative RLE DVT study. [NO]      ED Course User Index  [NO] Ong-Pham, Altha Gosselin, PA       Provider Notes (Medical Decision Making):     MDM  Number of Diagnoses or Management Options  Right leg pain  Diagnosis management comments:     27-year-old [facility] for D[facility] out. DVT study negative in right lower extremity. Advised patient take NSAID for symptomatic relief if needed. No other symptoms requiring further work-up here today. She is Covid positive. Vital signs stable. Return precautions discussed       Amount and/or Complexity of Data Reviewed  Tests in the radiology section of CPT®: ordered and reviewed  Review and summarize past medical records: yes    Patient Progress  Patient progress: stable             Disposition   Disposition: DC- Adult Discharges: All of the diagnostic tests were reviewed and questions answered. Diagnosis, care plan and treatment options were discussed. The patient understands the instructions and will follow up as directed. The patients results have been reviewed with them. They have been counseled regarding their diagnosis. The patient verbally convey understanding and agreement of the signs, symptoms, diagnosis, treatment and prognosis and additionally agrees to follow up as recommended with their PCP in 24 - 48 hours. They also agree with the care-plan and convey that all of their questions have been answered. I have also put together some discharge instructions for them that include: 1) educational information regarding their diagnosis, 2) how to care for their diagnosis at home, as well a 3) list of reasons why they would want to return to the ED prior to their follow-up appointment, should their condition change. Discharged    DISCHARGE PLAN:  1.    Current Discharge Medication List      CONTINUE these medications which have NOT CHANGED    Details   predniSONE (STERAPRED DS) 10 mg dose pack Take as directed per package insert      butalbital-acetaminophen-caff (Fioricet) -40 mg per capsule Take 1 Cap by mouth every four (4) hours as needed for Headache. Qty: 15 Cap, Refills: 0      albuterol (PROVENTIL HFA, VENTOLIN HFA, PROAIR HFA) 90 mcg/actuation inhaler            2.   Follow-up Information     Follow up With Specialties Details Why [address] [facility] EMERGENCY DEPT Emergency Medicine  As needed, If symptoms worsen [address] [facility] Cristopher Farooq MD Family Medicine  As needed [address]  [phone]          3. Return to ED if worse   4. Current Discharge Medication List            Diagnosis     Clinical Impression:   1. Right leg pain        Attestations:    MARSHA Cunha    Please note that this dictation was completed with [employer], the computer voice recognition software. Quite often unanticipated grammatical, syntax, homophones, and other interpretive errors are inadvertently transcribed by the computer software. Please disregard these errors. Please excuse any errors that have escaped final proofreading. Thank you.

## 2021-01-29 ENCOUNTER — TELEPHONE (OUTPATIENT)
Dept: FAMILY MEDICINE CLINIC | Age: 36
End: 2021-01-29

## 2021-01-29 NOTE — TELEPHONE ENCOUNTER
Patient called wanting to know if you could order her something for her anxiety until she gets in to see her psychiatrist on Tuesday.

## 2021-02-24 ENCOUNTER — HOSPITAL ENCOUNTER (EMERGENCY)
Age: 36
Discharge: HOME | End: 2021-02-24
Attending: EMERGENCY MEDICINE
Payer: COMMERCIAL

## 2021-02-24 VITALS
DIASTOLIC BLOOD PRESSURE: 78 MMHG | BODY MASS INDEX: 26.07 KG/M2 | TEMPERATURE: 98 F | HEIGHT: 68 IN | OXYGEN SATURATION: 99 % | WEIGHT: 172 LBS | SYSTOLIC BLOOD PRESSURE: 113 MMHG | RESPIRATION RATE: 16 BRPM | HEART RATE: 104 BPM

## 2021-02-24 DIAGNOSIS — G44.86 CERVICOGENIC HEADACHE: Primary | ICD-10-CM

## 2021-02-24 PROCEDURE — 99282 EMERGENCY DEPT VISIT SF MDM: CPT

## 2021-02-24 RX ORDER — BUSPIRONE HYDROCHLORIDE 5 MG/1
5 TABLET ORAL 2 TIMES DAILY
COMMUNITY
End: 2021-03-16 | Stop reason: ALTCHOICE

## 2021-02-24 RX ORDER — ACETAMINOPHEN 500 MG
1000 TABLET ORAL
Status: DISCONTINUED | OUTPATIENT
Start: 2021-02-24 | End: 2021-02-24

## 2021-02-24 RX ORDER — BUTALBITAL, ACETAMINOPHEN AND CAFFEINE 300; 40; 50 MG/1; MG/1; MG/1
1 CAPSULE ORAL
Qty: 10 CAP | Refills: 0 | Status: SHIPPED | OUTPATIENT
Start: 2021-02-24 | End: 2021-03-16 | Stop reason: ALTCHOICE

## 2021-02-24 RX ORDER — AMOXICILLIN 500 MG/1
TABLET, FILM COATED ORAL
COMMUNITY
End: 2021-03-16 | Stop reason: ALTCHOICE

## 2021-02-24 NOTE — ED PROVIDER NOTES
EMERGENCY DEPARTMENT HISTORY AND PHYSICAL EXAM      Date: 2/24/2021  Patient Name: Linda Maldonado    History of Presenting Illness     Chief Complaint   Patient presents with    Headache       History Provided By: Patient    HPI: Linda Maldonado, 28 y.o. female with a past medical history significant for Concussion presents to the ED with cc of intermittent headache worse with extended use of the computer keyboard and bending over. Patient has a remote history of concussion in May last year, no history of head injury since then. No nausea vomiting or changes in vision. No acute dizziness or chronic recurrent dizziness. There are no other complaints, changes, or physical findings at this time. PCP: Carolynn Eli MD    No current facility-administered medications on file prior to encounter. Current Outpatient Medications on File Prior to Encounter   Medication Sig Dispense Refill    amoxicillin 500 mg tab Take  by mouth. Every 6 hours      busPIRone (BUSPAR) 5 mg tablet Take 5 mg by mouth two (2) times a day. Past History     Past Medical History:  Past Medical History:   Diagnosis Date    Anxiety     Asthma     Iron deficiency anemia 9/29/2020    Neck pain 9/29/2020    Uterine leiomyoma 9/29/2020       Past Surgical History:  History reviewed. No pertinent surgical history. Family History:  Family History   Problem Relation Age of Onset    Hypertension Mother        Social History:  Social History     Tobacco Use    Smoking status: Never Smoker    Smokeless tobacco: Never Used   Substance Use Topics    Alcohol use: Never     Frequency: Never    Drug use: Never       Allergies:  No Known Allergies      Review of Systems     Review of Systems   Constitutional: Negative for diaphoresis and fatigue. HENT: Negative for congestion, dental problem, ear discharge and ear pain. Eyes: Negative for discharge and redness.    Respiratory: Negative for cough, chest tightness and shortness of breath. Cardiovascular: Negative for chest pain and palpitations. Gastrointestinal: Negative for abdominal pain, constipation, diarrhea, nausea and vomiting. Endocrine: Negative. Genitourinary: Negative. Negative for dysuria and frequency. Musculoskeletal: Negative for arthralgias and myalgias. Skin: Negative. Neurological: Positive for headaches. Negative for dizziness, syncope and light-headedness. Hematological: Negative. Psychiatric/Behavioral: Negative for agitation and behavioral problems. All other systems reviewed and are negative. Physical Exam     Physical Exam  Vitals signs and nursing note reviewed. Constitutional:       Appearance: Normal appearance. She is normal weight. HENT:      Head: Normocephalic and atraumatic. Nose: Nose normal.      Mouth/Throat:      Mouth: Mucous membranes are moist.      Pharynx: Oropharynx is clear. Eyes:      Extraocular Movements: Extraocular movements intact. Conjunctiva/sclera: Conjunctivae normal.      Pupils: Pupils are equal, round, and reactive to light. Neck:      Musculoskeletal: Normal range of motion and neck supple. Cardiovascular:      Rate and Rhythm: Normal rate and regular rhythm. Pulses: Normal pulses. Heart sounds: Normal heart sounds. Pulmonary:      Effort: Pulmonary effort is normal.      Breath sounds: Normal breath sounds. Abdominal:      General: Abdomen is flat. Palpations: Abdomen is soft. Musculoskeletal: Normal range of motion. Skin:     General: Skin is warm and dry. Capillary Refill: Capillary refill takes less than 2 seconds. Neurological:      General: No focal deficit present. Mental Status: She is alert and oriented to person, place, and time.    Psychiatric:         Mood and Affect: Mood normal.         Behavior: Behavior normal.         Lab and Diagnostic Study Results     Labs -   No results found for this or any previous visit (from the past 12 hour(s)). Radiologic Studies -     CT Results  (Last 48 hours)    None        CXR Results  (Last 48 hours)    None            Medical Decision Making     - I am the first provider for this patient. - I reviewed the vital signs, available nursing notes, past medical history, past surgical history, family history and social history. - Initial assessment performed. The patients presenting problems have been discussed, and they are in agreement with the care plan formulated and outlined with them. I have encouraged them to ask questions as they arise throughout their visit. Vital Signs-Reviewed the patient's vital signs. No data found. Records Reviewed: Nursing Notes    ED Course/Provider Notes (Medical Decision Making): Uneventful ED course, clinical improvement with therapy, patient will be discharged to followup with PCP as directed    Disposition     Disposition: Condition stable and improved  DC- Adult Discharges: All of the diagnostic tests were reviewed and questions answered. Diagnosis, care plan and treatment options were discussed. The patient understands the instructions and will follow up as directed. The patients results have been reviewed with them. They have been counseled regarding their diagnosis. The patient verbally convey understanding and agreement of the signs, symptoms, diagnosis, treatment and prognosis and additionally agrees to follow up as recommended with their PCP in 24 - 48 hours. They also agree with the care-plan and convey that all of their questions have been answered. I have also put together some discharge instructions for them that include: 1) educational information regarding their diagnosis, 2) how to care for their diagnosis at home, as well a 3) list of reasons why they would want to return to the ED prior to their follow-up appointment, should their condition change. DISCHARGE PLAN:  1.    Current Discharge Medication List      CONTINUE these medications which have NOT CHANGED    Details   amoxicillin 500 mg tab Take  by mouth. Every 6 hours      busPIRone (BUSPAR) 5 mg tablet Take 5 mg by mouth two (2) times a day. 2.   Follow-up Information     Follow up With Specialties Details Why Contact Info    Follow-up with PCP of your choice  In 2 days          3. Return to ED if worse   4. Discharge Medication List as of 2/24/2021  4:32 PM      START taking these medications    Details   butalbital-acetaminophen-caff (Fioricet) -40 mg per capsule Take 1 Cap by mouth every six (6) hours as needed for Headache., Normal, Disp-10 Cap, R-0         CONTINUE these medications which have NOT CHANGED    Details   amoxicillin 500 mg tab Take  by mouth. Every 6 hours, Historical Med      busPIRone (BUSPAR) 5 mg tablet Take 5 mg by mouth two (2) times a day., Historical Med               Diagnosis     Clinical Impression:   1. Cervicogenic headache        Attestations:    Lauren Eddy MD    Please note that this dictation was completed with [employer], the [employer] voice recognition software. Quite often unanticipated grammatical, syntax, homophones, and other interpretive errors are inadvertently transcribed by the computer software. Please disregard these errors. Please excuse any errors that have escaped final proofreading. Thank you.

## 2021-02-24 NOTE — ED TRIAGE NOTES
Left sided head pain started today .   Concerned because \"I hit my head last May\", \"my anxiety makes my head hurt worse\"

## 2021-02-24 NOTE — DISCHARGE INSTRUCTIONS
Take medicines as prescribed and follow-up with your PCP in 3 to 5 days. Avoid abnormal sitting positions while working on a computer at home.

## 2021-03-12 ENCOUNTER — HOSPITAL ENCOUNTER (EMERGENCY)
Age: 36
Discharge: HOME | End: 2021-03-13
Payer: COMMERCIAL

## 2021-03-12 DIAGNOSIS — T88.7XXA MEDICATION SIDE EFFECT: ICD-10-CM

## 2021-03-12 DIAGNOSIS — G44.209 TENSION HEADACHE: Primary | ICD-10-CM

## 2021-03-12 PROCEDURE — 99283 EMERGENCY DEPT VISIT LOW MDM: CPT

## 2021-03-12 PROCEDURE — 96374 THER/PROPH/DIAG INJ IV PUSH: CPT

## 2021-03-13 ENCOUNTER — APPOINTMENT (OUTPATIENT)
Dept: CT IMAGING | Age: 36
End: 2021-03-13
Attending: PHYSICIAN ASSISTANT
Payer: COMMERCIAL

## 2021-03-13 VITALS
TEMPERATURE: 98.2 F | HEIGHT: 68 IN | DIASTOLIC BLOOD PRESSURE: 91 MMHG | HEART RATE: 91 BPM | BODY MASS INDEX: 26.07 KG/M2 | SYSTOLIC BLOOD PRESSURE: 148 MMHG | WEIGHT: 172 LBS | OXYGEN SATURATION: 99 % | RESPIRATION RATE: 16 BRPM

## 2021-03-13 LAB
ANION GAP SERPL CALC-SCNC: 5 MMOL/L (ref 5–15)
APPEARANCE UR: CLEAR
BACTERIA URNS QL MICRO: NEGATIVE /HPF
BASOPHILS # BLD: 0 K/UL (ref 0–0.1)
BASOPHILS NFR BLD: 1 % (ref 0–1)
BILIRUB UR QL: NEGATIVE
BUN SERPL-MCNC: 6 MG/DL (ref 6–20)
BUN/CREAT SERPL: 10 (ref 12–20)
CA-I BLD-MCNC: 9.2 MG/DL (ref 8.5–10.1)
CHLORIDE SERPL-SCNC: 106 MMOL/L (ref 97–108)
CO2 SERPL-SCNC: 27 MMOL/L (ref 21–32)
COLOR UR: ABNORMAL
CREAT SERPL-MCNC: 0.6 MG/DL (ref 0.55–1.02)
DIFFERENTIAL METHOD BLD: ABNORMAL
EOSINOPHIL # BLD: 0.1 K/UL (ref 0–0.4)
EOSINOPHIL NFR BLD: 1 % (ref 0–7)
ERYTHROCYTE [DISTWIDTH] IN BLOOD BY AUTOMATED COUNT: 13 % (ref 11.5–14.5)
GLUCOSE SERPL-MCNC: 96 MG/DL (ref 65–100)
GLUCOSE UR STRIP.AUTO-MCNC: NEGATIVE MG/DL
HCT VFR BLD AUTO: 38.4 % (ref 35–47)
HGB BLD-MCNC: 12.7 G/DL (ref 11.5–16)
HGB UR QL STRIP: ABNORMAL
IMM GRANULOCYTES # BLD AUTO: 0 K/UL (ref 0–0.04)
IMM GRANULOCYTES NFR BLD AUTO: 0 % (ref 0–0.5)
KETONES UR QL STRIP.AUTO: NEGATIVE MG/DL
LEUKOCYTE ESTERASE UR QL STRIP.AUTO: NEGATIVE
LYMPHOCYTES # BLD: 2.1 K/UL (ref 0.8–3.5)
LYMPHOCYTES NFR BLD: 55 % (ref 12–49)
MCH RBC QN AUTO: 30.2 PG (ref 26–34)
MCHC RBC AUTO-ENTMCNC: 33.1 G/DL (ref 30–36.5)
MCV RBC AUTO: 91.2 FL (ref 80–99)
MONOCYTES # BLD: 0.6 K/UL (ref 0–1)
MONOCYTES NFR BLD: 15 % (ref 5–13)
NEUTS SEG # BLD: 1.1 K/UL (ref 1.8–8)
NEUTS SEG NFR BLD: 28 % (ref 32–75)
NITRITE UR QL STRIP.AUTO: NEGATIVE
PH UR STRIP: 7 [PH] (ref 5–8)
PLATELET # BLD AUTO: 242 K/UL (ref 150–400)
PMV BLD AUTO: 11.1 FL (ref 8.9–12.9)
POTASSIUM SERPL-SCNC: 3.6 MMOL/L (ref 3.5–5.1)
PROT UR STRIP-MCNC: NEGATIVE MG/DL
RBC # BLD AUTO: 4.21 M/UL (ref 3.8–5.2)
RBC #/AREA URNS HPF: ABNORMAL /HPF (ref 0–5)
SODIUM SERPL-SCNC: 138 MMOL/L (ref 136–145)
SP GR UR REFRACTOMETRY: 1.01 (ref 1–1.03)
UROBILINOGEN UR QL STRIP.AUTO: 0.1 EU/DL (ref 0.1–1)
WBC # BLD AUTO: 3.8 K/UL (ref 3.6–11)
WBC URNS QL MICRO: ABNORMAL /HPF (ref 0–4)

## 2021-03-13 PROCEDURE — 80048 BASIC METABOLIC PNL TOTAL CA: CPT

## 2021-03-13 PROCEDURE — 81001 URINALYSIS AUTO W/SCOPE: CPT

## 2021-03-13 PROCEDURE — 70450 CT HEAD/BRAIN W/O DYE: CPT

## 2021-03-13 PROCEDURE — 36415 COLL VENOUS BLD VENIPUNCTURE: CPT

## 2021-03-13 PROCEDURE — 74011250636 HC RX REV CODE- 250/636: Performed by: PHYSICIAN ASSISTANT

## 2021-03-13 PROCEDURE — 85025 COMPLETE CBC W/AUTO DIFF WBC: CPT

## 2021-03-13 RX ORDER — KETOROLAC TROMETHAMINE 15 MG/ML
15 INJECTION, SOLUTION INTRAMUSCULAR; INTRAVENOUS ONCE
Status: COMPLETED | OUTPATIENT
Start: 2021-03-13 | End: 2021-03-13

## 2021-03-13 RX ADMIN — KETOROLAC TROMETHAMINE 15 MG: 15 INJECTION, SOLUTION INTRAMUSCULAR; INTRAVENOUS at 00:36

## 2021-03-13 RX ADMIN — SODIUM CHLORIDE 1000 ML: 9 INJECTION, SOLUTION INTRAVENOUS at 00:34

## 2021-03-13 NOTE — ED PROVIDER NOTES
EMERGENCY DEPARTMENT HISTORY AND PHYSICAL EXAM      Date: 3/12/2021  Patient Name: Zehra Olivares    History of Presenting Illness     Chief Complaint   Patient presents with    Fatigue    Headache       History Provided By: Patient    HPI: Zehra Olivares, 28 y.o. female with a past medical history significant anxiety and OCD presents to the ED with cc of right-sided headache x2 days, described as a tightness sensation, rated a 2 out of 10 at this time. Associated symptoms include a feeling of jitteriness and weakness. Patient reports her symptoms all started after she started taking new psychiatric medications to include Zoloft and risperidone. She did not take her medications tonight because she felt like they were causing side effects. She went to [facility] earlier today without any work-up. Patient states she tried taking some Tylenol which did not relieve her headache. She noticed that her symptoms started right after she started taking the new psychiatric medications. She tried to call her doctor but did not get an answer. She specifically denies suicidal ideation, thoughts of self-harm, fever, neck pain, chills, body aches, chest pain, shortness of breath, nausea, vomiting, diarrhea, numbness, tingling, recent head injury. There are no other complaints, changes, or physical findings at this time. PCP: Janett Sam MD    Current Outpatient Medications   Medication Sig Dispense Refill    amoxicillin 500 mg tab Take  by mouth. Every 6 hours      busPIRone (BUSPAR) 5 mg tablet Take 5 mg by mouth two (2) times a day.  butalbital-acetaminophen-caff (Fioricet) -40 mg per capsule Take 1 Cap by mouth every six (6) hours as needed for Headache.  10 Cap 0       Past History     Past Medical History:  Past Medical History:   Diagnosis Date    Anxiety     Asthma     Iron deficiency anemia 9/29/2020    Neck pain 9/29/2020    Uterine leiomyoma 9/29/2020       Past Surgical History:  No past surgical history on file. Family History:  Family History   Problem Relation Age of Onset    Hypertension Mother        Social History:  Social History     Tobacco Use    Smoking status: Never Smoker    Smokeless tobacco: Never Used   Substance Use Topics    Alcohol use: Never     Frequency: Never    Drug use: Never       Allergies:  No Known Allergies      Review of Systems   Review of Systems   Constitutional: Negative for activity change, chills and fever. \"jittery\"   HENT: Negative for congestion, ear pain, rhinorrhea, sneezing and sore throat. Eyes: Negative for pain and visual disturbance. Respiratory: Negative for cough and shortness of breath. Cardiovascular: Negative for chest pain. Gastrointestinal: Negative for abdominal pain, diarrhea, nausea and vomiting. Genitourinary: Negative for dysuria and hematuria. Musculoskeletal: Negative for gait problem. Skin: Negative for rash. Neurological: Positive for weakness and headaches. Negative for speech difficulty. Psychiatric/Behavioral: The patient is not nervous/anxious. All other systems reviewed and are negative. Physical Exam   Physical Exam  Vitals signs and nursing note reviewed. Constitutional:       General: She is not in acute distress. Appearance: Normal appearance. She is not toxic-appearing. HENT:      Head: Normocephalic and atraumatic. Nose: Nose normal.      Mouth/Throat:      Mouth: Mucous membranes are moist.   Eyes:      Extraocular Movements: Extraocular movements intact. Conjunctiva/sclera: Conjunctivae normal.      Pupils: Pupils are equal, round, and reactive to light. Neck:      Musculoskeletal: Normal range of motion and neck supple. No spinous process tenderness or muscular tenderness. Cardiovascular:      Rate and Rhythm: Normal rate. Pulses: Normal pulses. Heart sounds: Normal heart sounds.    Pulmonary:      Effort: Pulmonary effort is normal. No respiratory distress. Breath sounds: Normal breath sounds. Abdominal:      General: Bowel sounds are normal.      Palpations: Abdomen is soft. Tenderness: There is no abdominal tenderness. Musculoskeletal: Normal range of motion. General: No deformity or signs of injury. Skin:     General: Skin is warm and dry. Capillary Refill: Capillary refill takes less than 2 seconds. Findings: No rash. Neurological:      General: No focal deficit present. Mental Status: She is alert and oriented to person, place, and time. GCS: GCS eye subscore is 4. GCS verbal subscore is 5. GCS motor subscore is 6. Cranial Nerves: No cranial nerve deficit. Sensory: Sensation is intact. Motor: Motor function is intact. Coordination: Coordination is intact. Gait: Gait is intact. Psychiatric:         Mood and Affect: Mood normal.         Diagnostic Study Results     Labs -     Recent Results (from the past 48 hour(s))   CBC WITH AUTOMATED DIFF    Collection Time: 03/13/21 12:20 AM   Result Value Ref Range    WBC 3.8 3.6 - 11.0 K/uL    RBC 4.21 3.80 - 5.20 M/uL    HGB 12.7 11.5 - 16.0 g/dL    HCT 38.4 35.0 - 47.0 %    MCV 91.2 80.0 - 99.0 FL    MCH 30.2 26.0 - 34.0 PG    MCHC 33.1 30.0 - 36.5 g/dL    RDW 13.0 11.5 - 14.5 %    PLATELET 928 162 - 462 K/uL    MPV 11.1 8.9 - 12.9 FL    NEUTROPHILS 28 (L) 32 - 75 %    LYMPHOCYTES 55 (H) 12 - 49 %    MONOCYTES 15 (H) 5 - 13 %    EOSINOPHILS 1 0 - 7 %    BASOPHILS 1 0 - 1 %    IMMATURE GRANULOCYTES 0 0.0 - 0.5 %    ABS. NEUTROPHILS 1.1 (L) 1.8 - 8.0 K/UL    ABS. LYMPHOCYTES 2.1 0.8 - 3.5 K/UL    ABS. MONOCYTES 0.6 0.0 - 1.0 K/UL    ABS. EOSINOPHILS 0.1 0.0 - 0.4 K/UL    ABS. BASOPHILS 0.0 0.0 - 0.1 K/UL    ABS. IMM.  GRANS. 0.0 0.00 - 0.04 K/UL    DF AUTOMATED     METABOLIC PANEL, BASIC    Collection Time: 03/13/21 12:20 AM   Result Value Ref Range    Sodium 138 136 - 145 mmol/L    Potassium 3.6 3.5 - 5.1 mmol/L    Chloride 106 97 - 108 mmol/L    CO2 27 21 - 32 mmol/L    Anion gap 5 5 - 15 mmol/L    Glucose 96 65 - 100 mg/dL    BUN 6 6 - 20 mg/dL    Creatinine 0.60 0.55 - 1.02 mg/dL    BUN/Creatinine ratio 10 (L) 12 - 20      GFR est AA >60 >60 ml/min/1.73m2    GFR est non-AA >60 >60 ml/min/1.73m2    Calcium 9.2 8.5 - 10.1 mg/dL   URINALYSIS W/MICROSCOPIC    Collection Time: 03/13/21 12:20 AM   Result Value Ref Range    Color Yellow/Straw      Appearance Clear Clear      Specific gravity 1.009 1.003 - 1.030      pH (UA) 7.0 5.0 - 8.0      Protein Negative Negative mg/dL    Glucose Negative Negative mg/dL    Ketone Negative Negative mg/dL    Bilirubin Negative Negative      Blood Moderate (A) Negative      Urobilinogen 0.1 0.1 - 1.0 EU/dL    Nitrites Negative Negative      Leukocyte Esterase Negative Negative      WBC 0-4 0 - 4 /hpf    RBC 5-10 0 - 5 /hpf    Bacteria Negative Negative /hpf       Radiologic Studies -   XR Results (most recent):  No results found for this or any previous visit. CT Results  (Last 48 hours)               03/13/21 0053  CT HEAD WO CONT Final result    Impression:      No acute intracranial bleed. Follow-up as clinically indicated. Narrative:  CT head without contrast       Dose reduction: All CT scans at this facility are performed using dose reduction   optimization techniques as appropriate to a performed exam including the   following: Automated exposure control, adjustments of the mA and/or kV according   to patient size, or use of iterative reconstruction technique. INDICATION: Head pressure       COMPARISON: 7/30/2020       FINDINGS: No acute intracranial bleed or midline shift. Lateral ventricles are   normal in size. A noncontrast head CT does not exclude the possibility of an   acute ischemic stroke. MRI is more sensitive for evaluation of brain parenchyma. No skull fracture. No fluid in the mastoid air cells. Minimal mucosal thickening   left maxillary sinus.                    Medical Decision Making and ED Course   I am the first provider for this patient. I reviewed the vital signs, available nursing notes, past medical history, past surgical history, family history and social history. Vital Signs-Reviewed the patient's vital signs. Patient Vitals for the past 12 hrs:   Temp Pulse Resp BP SpO2   03/13/21 0027 98.2 °F (36.8 °C) 91  (!) 148/91 99 %   03/12/21 2351 98.1 °F (36.7 °C) 88 16 (!) 159/98 98 %       Records Reviewed: Nursing Notes and Old Medical Records    Provider Notes (Medical Decision Making):       MDM  Number of Diagnoses or Management Options  Medication side effect  Tension headache  Diagnosis management comments: Differentials include medication side effect, dehydration, intracranial abnormality, tension headache    Patient has been encouraged to contact her psychiatrist on Monday morning for a close follow-up appointment and discussion of medications. I advised that she continue to take her medications as prescribed. Amount and/or Complexity of Data Reviewed  Clinical lab tests: ordered and reviewed  Tests in the radiology section of CPT®: ordered and reviewed          ED Course:   Initial assessment performed. The patients presenting problems have been discussed, and they are in agreement with the care plan formulated and outlined with them. I have encouraged them to ask questions as they arise throughout their visit. 2:44 AM  Progress Note:  Patient was re-evaluated at bedside. Feeling much better, headache improved, ready to go home. Procedures       Suzanne Frances PA-C    Procedures     Suzanne Frances PA-C        Disposition     Disposition: DC- Adult Discharges: All of the diagnostic tests were reviewed and questions answered. Diagnosis, care plan and treatment options were discussed. The patient understands the instructions and will follow up as directed. The patients results have been reviewed with them.   They have been counseled regarding their diagnosis. The patient verbally convey understanding and agreement of the signs, symptoms, diagnosis, treatment and prognosis and additionally agrees to follow up as recommended with their PCP in 24 - 48 hours. They also agree with the care-plan and convey that all of their questions have been answered. I have also put together some discharge instructions for them that include: 1) educational information regarding their diagnosis, 2) how to care for their diagnosis at home, as well a 3) list of reasons why they would want to return to the ED prior to their follow-up appointment, should their condition change. Discharged     DISCHARGE PLAN:  1. Current Discharge Medication List      CONTINUE these medications which have NOT CHANGED    Details   amoxicillin 500 mg tab Take  by mouth. Every 6 hours      busPIRone (BUSPAR) 5 mg tablet Take 5 mg by mouth two (2) times a day. butalbital-acetaminophen-caff (Fioricet) -40 mg per capsule Take 1 Cap by mouth every six (6) hours as needed for Headache. Qty: 10 Cap, Refills: 0           2. Follow-up Information     Follow up With Specialties Details Why Contact Info    Julien Bhakta MD [facility] Medicine Schedule an appointment as soon as possible for a visit  for follow up from ER visit [address]  [phone]      Your Psychiatrist   Call monday for appointment about medications     [facility] EMERGENCY DEPT Emergency Medicine  As needed, If symptoms worsen [address] [facility] [address]  [phone]        3. Return to ED if worse   4. Current Discharge Medication List          Diagnosis     Clinical Impression:   1. Tension headache    2. Medication side effect        Attestations:    Junior Kline PA-C    Please note that this dictation was completed with [employer], the [employer] voice recognition software.   Quite often unanticipated grammatical, syntax, homophones, and other interpretive errors are inadvertently transcribed by the computer software. Please disregard these errors. Please excuse any errors that have escaped final proofreading. Thank you.

## 2021-03-16 ENCOUNTER — OFFICE VISIT (OUTPATIENT)
Dept: FAMILY MEDICINE CLINIC | Age: 36
End: 2021-03-16
Payer: COMMERCIAL

## 2021-03-16 VITALS
OXYGEN SATURATION: 99 % | SYSTOLIC BLOOD PRESSURE: 130 MMHG | DIASTOLIC BLOOD PRESSURE: 91 MMHG | RESPIRATION RATE: 18 BRPM | BODY MASS INDEX: 26.07 KG/M2 | WEIGHT: 172 LBS | HEIGHT: 68 IN | HEART RATE: 105 BPM | TEMPERATURE: 98.2 F

## 2021-03-16 DIAGNOSIS — R42 DIZZINESS: ICD-10-CM

## 2021-03-16 DIAGNOSIS — Z11.59 ENCOUNTER FOR HEPATITIS C SCREENING TEST FOR LOW RISK PATIENT: ICD-10-CM

## 2021-03-16 DIAGNOSIS — Z13.29 SCREENING FOR THYROID DISORDER: ICD-10-CM

## 2021-03-16 DIAGNOSIS — Z13.21 ENCOUNTER FOR VITAMIN DEFICIENCY SCREENING: ICD-10-CM

## 2021-03-16 DIAGNOSIS — G44.229 CHRONIC TENSION-TYPE HEADACHE, NOT INTRACTABLE: Primary | ICD-10-CM

## 2021-03-16 DIAGNOSIS — Z86.2 HISTORY OF ANEMIA: ICD-10-CM

## 2021-03-16 DIAGNOSIS — R03.0 ELEVATED BP WITHOUT DIAGNOSIS OF HYPERTENSION: ICD-10-CM

## 2021-03-16 PROCEDURE — 99214 OFFICE O/P EST MOD 30 MIN: CPT | Performed by: NURSE PRACTITIONER

## 2021-03-16 RX ORDER — RISPERIDONE 0.5 MG/1
TABLET, FILM COATED ORAL
COMMUNITY
Start: 2021-03-02

## 2021-03-16 RX ORDER — AMITRIPTYLINE HYDROCHLORIDE 50 MG/1
50 TABLET, FILM COATED ORAL
COMMUNITY
Start: 2021-03-15 | End: 2021-04-28 | Stop reason: SDDI

## 2021-03-16 RX ORDER — SERTRALINE HYDROCHLORIDE 50 MG/1
TABLET, FILM COATED ORAL
COMMUNITY
Start: 2021-03-02 | End: 2021-04-10

## 2021-03-16 RX ORDER — MECLIZINE HYDROCHLORIDE 25 MG/1
TABLET ORAL
COMMUNITY
End: 2021-03-16 | Stop reason: ALTCHOICE

## 2021-03-16 NOTE — PROGRESS NOTES
Chief Complaint   Patient presents with    Follow-up     pain on right side of head, went neurology yesterday and [facility] friday, not sure if meds    Dizziness     Visit Vitals  BP (!) 130/91 (BP 1 Location: Left arm, BP Patient Position: Sitting, BP Cuff Size: Adult)   Pulse (!) 105   Temp 98.2 °F (36.8 °C) (Oral)   Resp 18   Ht 5' 8\" (1.727 m)   Wt 172 lb (78 kg)   LMP 02/15/2021   SpO2 99%   BMI 26.15 kg/m²     Subjective  Chantel Stanley is a 28 y.o. female. HPI:  Pt presented for f/u after ER visit on 3/1/2021 w/ c/o of dizziness and headache on R side of head for a few days. Review of ER record indicated that she had a CT which was unremarkable. Review of her old record indicated that dizziness and headaches are not new symptoms for her. She has hx of hitting her head on a metal  tampon dispenser at her job in in May 2020 and sustained a head injury. She had a CT 6/11/2020 which was unremarkable. She saw a concussion specialist on 7/23/2020 and according to pt he told her that she would recover but would take time. Apparently the headaches went away for awhile but have now returned. Had dull headache in office. Feels a band across her forehead and into temples. Started a few days ago. Came on suddenly. She is still having dizziness. She is dizzy all day. Denied any recent allergy or sinus symptoms or pressure in ears that might indicate sinus fluid contributing ot symptom of dizziness. Pt stated she has hx of iron deficiency anemia so will check labs in case low iron levels are a factor in dizziness Will also check thyroid levels. Pt has dxes of anxiety and OCD. She is under care of psychiatrist who ordered risperidone and sertraline. Pt stopped taking because she thought the meds were causing the headaches. She called the psychiatrist's office to discuss and left a message but has not heard back yet. Her B/P was elevated in ER and also in office @ 130/91.  Not currently on medication. Dr. Justina Worley had ordered her HCTZ in past.     Patient Active Problem List   Diagnosis Code    Iron deficiency anemia D50.9    Uterine leiomyoma D25.9    Headache R51.9    Anxiety F41.9    Dental caries K02.9    History of concussion Z87.820    OCD (obsessive compulsive disorder) F42.9     Past Medical History:   Diagnosis Date    Anxiety     Asthma     Iron deficiency anemia 9/29/2020    Neck pain 9/29/2020    Uterine leiomyoma 9/29/2020     History reviewed. No pertinent surgical history. Current Outpatient Medications   Medication Instructions    amitriptyline (ELAVIL) 50 mg, EVERY BEDTIME    hydroCHLOROthiazide (HYDRODIURIL) 12.5 mg, Oral, DAILY    risperiDONE (RisperDAL) 0.5 mg tablet TAKE 1 TABLET BY MOUTH AT BEDTIME    sertraline (ZOLOFT) 50 mg tablet TAKE 1 TABLET BY MOUTH AT BEDTIME     No Known Allergies  Social History     Tobacco Use    Smoking status: Never Smoker    Smokeless tobacco: Never Used   Substance Use Topics    Alcohol use: Never     Frequency: Never    Drug use: Never     Family History   Problem Relation Age of Onset    Hypertension Mother      Review of Systems   Constitutional: Negative for chills and fever. Eyes: Positive for blurred vision. Negative for double vision. A little blurred vision when symptoms started a week ago but not since. Respiratory: Negative for cough, shortness of breath and wheezing. Cardiovascular: Negative for chest pain. Gastrointestinal: Negative for abdominal pain, diarrhea, nausea and vomiting. Musculoskeletal: Negative for back pain and neck pain. Skin: Negative for rash. Neurological: Positive for dizziness and headaches. Negative for tingling. Objective  Physical Exam  Constitutional:       General: She is not in acute distress. Appearance: Normal appearance. HENT:      Head: Normocephalic. Right Ear: External ear normal.      Left Ear: External ear normal.      Nose: Congestion present. Comments: Markedly swollen turbinates bilaterally  Eyes:      Conjunctiva/sclera: Conjunctivae normal.   Neck:      Musculoskeletal: Neck supple. Vascular: No carotid bruit. Cardiovascular:      Rate and Rhythm: Normal rate and regular rhythm. Heart sounds: Normal heart sounds. No murmur. Pulmonary:      Effort: Pulmonary effort is normal. No respiratory distress. Breath sounds: Normal breath sounds. Musculoskeletal: Normal range of motion. General: No swelling. Right lower leg: No edema. Left lower leg: No edema. Skin:     General: Skin is warm and dry. Coloration: Skin is not jaundiced. Neurological:      General: No focal deficit present. Mental Status: She is alert and oriented to person, place, and time. Sensory: No sensory deficit. Motor: No weakness. Coordination: Coordination normal.      Gait: Gait normal.   Psychiatric:         Mood and Affect: Mood normal.         Behavior: Behavior normal.         Thought Content: Thought content normal.       Assessment & Plan      ICD-10-CM ICD-9-CM    1. Chronic tension-type headache, not intractable  G44.229 339.12    2. Elevated BP without diagnosis of hypertension  R03.0 796.2 hydroCHLOROthiazide (HYDRODIURIL) 12.5 mg tablet   3. Dizziness  R42 780.4    4. History of anemia  Z86.2 V12.3 IRON PROFILE      FERRITIN   5. Screening for thyroid disorder  Z13.29 V77.0 TSH 3RD GENERATION      T4, FREE   6. Encounter for vitamin deficiency screening  Z13.21 V77.99 VITAMIN D, 25 HYDROXY   7. Encounter for hepatitis C screening test for low risk patient  Z11.59 V73.89 HEPATITIS C AB, RFLX TO QT BY PCR     1. Chronic tension-type headache, not intractable  CT ok, suspect tension headache associated w/ anxiety. Symptomatic treatment w/ tylenol, ibuprofen, ice packs advised. 2. Elevated BP without diagnosis of hypertension  Will try low dose of HCTZ.  Pt advised to check B/P daily at home w/ goal < 130/80.     - hydroCHLOROthiazide (HYDRODIURIL) 12.5 mg tablet; Take 1 Tab by mouth daily. Dispense: 90 Tab; Refill: 1    3. Dizziness  Unclear etiology though has swollen turbinates so may have chronic allergies w/ sinusitis. Advised to try OTC antihistamine. 4. History of anemia  F/U labs. Add on supplement if indicated. - IRON PROFILE  - FERRITIN    5. Screening for thyroid disorder  F/U lab as a possible etiology for dizziness.    - TSH 3RD GENERATION  - T4, FREE    6. Encounter for vitamin deficiency screening  F/U labs- has never been checked and likely has deficiency due to no sun or food exposure. Add on supplement as indicated. - VITAMIN D, 25 HYDROXY; Future    7. Encounter for hepatitis C screening test for low risk patient  F/U labs. Routine one time screening for low risk pt.     - HEPATITIS C AB, RFLX TO QT BY PCR    I have discussed the diagnosis with the patient and the intended plan as seen in the above orders. Pt/caretaker has expressed understanding. Questions were answered concerning future plans. I have discussed medication side effects and warnings as indicated with the patient as well.     William Roman NP

## 2021-03-16 NOTE — PROGRESS NOTES
1. Have you been to the ER, urgent care clinic since your last visit? Hospitalized since your last visit? Yes Reason for visit: tightness on right side of head    2. Have you seen or consulted any other health care providers outside of the [address] [place] [address] since your last visit? Include any pap smears or colon screening.  Yes When: saw neurology yesterday and went to ER at [address] [place] [address] last friday  Chief Complaint   Patient presents with    Follow-up     pain on right side of head, went neurology yesterday and [facility] friday, not sure if meds    Dizziness     Visit Vitals  BP (!) 130/91 (BP 1 Location: Left arm, BP Patient Position: Sitting, BP Cuff Size: Adult)   Pulse (!) 105   Temp 98.2 °F (36.8 °C) (Oral)   Resp 18   Ht 5' 8\" (1.727 m)   Wt 172 lb (78 kg)   LMP 02/15/2021   SpO2 99%   BMI 26.15 kg/m²

## 2021-03-18 PROBLEM — F42.9 OCD (OBSESSIVE COMPULSIVE DISORDER): Status: ACTIVE | Noted: 2021-03-18

## 2021-03-18 RX ORDER — HYDROCHLOROTHIAZIDE 12.5 MG/1
12.5 TABLET ORAL DAILY
Qty: 90 TAB | Refills: 1 | Status: SHIPPED | OUTPATIENT
Start: 2021-03-18

## 2021-04-04 ENCOUNTER — HOSPITAL ENCOUNTER (EMERGENCY)
Age: 36
Discharge: HOME | End: 2021-04-04
Attending: FAMILY MEDICINE
Payer: COMMERCIAL

## 2021-04-04 VITALS
HEIGHT: 68 IN | WEIGHT: 174 LBS | SYSTOLIC BLOOD PRESSURE: 135 MMHG | BODY MASS INDEX: 26.37 KG/M2 | RESPIRATION RATE: 16 BRPM | OXYGEN SATURATION: 98 % | DIASTOLIC BLOOD PRESSURE: 92 MMHG | HEART RATE: 94 BPM | TEMPERATURE: 98.4 F

## 2021-04-04 DIAGNOSIS — G43.909 MIGRAINE WITHOUT STATUS MIGRAINOSUS, NOT INTRACTABLE, UNSPECIFIED MIGRAINE TYPE: Primary | ICD-10-CM

## 2021-04-04 PROCEDURE — 74011250636 HC RX REV CODE- 250/636: Performed by: FAMILY MEDICINE

## 2021-04-04 PROCEDURE — 96374 THER/PROPH/DIAG INJ IV PUSH: CPT

## 2021-04-04 PROCEDURE — 99283 EMERGENCY DEPT VISIT LOW MDM: CPT

## 2021-04-04 PROCEDURE — 74011250637 HC RX REV CODE- 250/637: Performed by: FAMILY MEDICINE

## 2021-04-04 RX ORDER — DIPHENHYDRAMINE HCL 25 MG
25 CAPSULE ORAL
Status: DISCONTINUED | OUTPATIENT
Start: 2021-04-04 | End: 2021-04-04

## 2021-04-04 RX ORDER — ACETAMINOPHEN 500 MG
1000 TABLET ORAL ONCE
Status: DISCONTINUED | OUTPATIENT
Start: 2021-04-04 | End: 2021-04-04

## 2021-04-04 RX ORDER — NAPROXEN 500 MG/1
500 TABLET ORAL 2 TIMES DAILY WITH MEALS
Qty: 30 TAB | Refills: 0 | Status: SHIPPED | OUTPATIENT
Start: 2021-04-04 | End: 2021-04-19

## 2021-04-04 RX ORDER — ONDANSETRON 4 MG/1
4 TABLET, ORALLY DISINTEGRATING ORAL
Status: COMPLETED | OUTPATIENT
Start: 2021-04-04 | End: 2021-04-04

## 2021-04-04 RX ORDER — KETOROLAC TROMETHAMINE 30 MG/ML
30 INJECTION, SOLUTION INTRAMUSCULAR; INTRAVENOUS
Status: COMPLETED | OUTPATIENT
Start: 2021-04-04 | End: 2021-04-04

## 2021-04-04 RX ADMIN — KETOROLAC TROMETHAMINE 30 MG: 30 INJECTION, SOLUTION INTRAMUSCULAR; INTRAVENOUS at 14:45

## 2021-04-04 RX ADMIN — ONDANSETRON 4 MG: 4 TABLET, ORALLY DISINTEGRATING ORAL at 14:44

## 2021-04-04 NOTE — ED TRIAGE NOTES
Pt c/o weakness, fatigue, and headache on right side of head. States that she had a head CT 3 weeks ago for headache, but over past 2 days started feeling worse.

## 2021-04-05 NOTE — ED PROVIDER NOTES
EMERGENCY DEPARTMENT HISTORY AND PHYSICAL EXAM      Date: 4/4/2021  Patient Name: Jensen Bains    History of Presenting Illness     Chief Complaint   Patient presents with    Headache    Fatigue       History Provided By:     HPI: Jensen Bains, is an extremely pleasant 28 y.o. female presenting to the ED with a chief complaint of headache. She states she has been experiencing off and on for several weeks. It is worse on the right side of her head. She does not have temporal pain. She endorses some generalized fatigue and photophobia but otherwise feels well. She denies any numbness or tingling in her extremities. She denies weakness in her extremities. She denies any recent head injuries. She denies fevers, chills nor rigors. She had a CT scan of her head on March 13, 2021 which demonstrated no acute intracranial process. There are no other complaints, changes, or physical findings at this time. PCP: Ciarra Galvez MD    No current facility-administered medications on file prior to encounter. Current Outpatient Medications on File Prior to Encounter   Medication Sig Dispense Refill    hydroCHLOROthiazide (HYDRODIURIL) 12.5 mg tablet Take 1 Tab by mouth daily. 90 Tab 1    risperiDONE (RisperDAL) 0.5 mg tablet TAKE 1 TABLET BY MOUTH AT BEDTIME      sertraline (ZOLOFT) 50 mg tablet TAKE 1 TABLET BY MOUTH AT BEDTIME      amitriptyline (ELAVIL) 50 mg tablet 50 mg nightly. Past History     Past Medical History:  Past Medical History:   Diagnosis Date    Anxiety     Asthma     Iron deficiency anemia 9/29/2020    Neck pain 9/29/2020    Uterine leiomyoma 9/29/2020       Past Surgical History:  History reviewed. No pertinent surgical history.     Family History:  Family History   Problem Relation Age of Onset    Hypertension Mother        Social History:  Social History     Tobacco Use    Smoking status: Never Smoker    Smokeless tobacco: Never Used   Substance Use Topics    Alcohol use: Never     Frequency: Never    Drug use: Never       Allergies:  No Known Allergies      Review of Systems     Review of Systems   Constitutional: Positive for fatigue. Negative for activity change, appetite change, chills and fever. HENT: Negative for congestion and sore throat. Eyes: Negative for photophobia and visual disturbance. Respiratory: Negative for cough, shortness of breath and wheezing. Cardiovascular: Negative for chest pain, palpitations and leg swelling. Gastrointestinal: Negative for abdominal pain, diarrhea, nausea and vomiting. Endocrine: Negative for cold intolerance and heat intolerance. Musculoskeletal: Negative for gait problem and joint swelling. Skin: Negative for color change and rash. Neurological: Positive for headaches. Negative for dizziness. Physical Exam     Physical Exam  Constitutional:       Appearance: She is well-developed. HENT:      Head: Normocephalic and atraumatic. Mouth/Throat:      Mouth: Mucous membranes are moist.      Pharynx: Oropharynx is clear. Eyes:      Conjunctiva/sclera: Conjunctivae normal.      Pupils: Pupils are equal, round, and reactive to light. Neck:      Musculoskeletal: Normal range of motion and neck supple. Cardiovascular:      Rate and Rhythm: Normal rate and regular rhythm. Heart sounds: No murmur. Pulmonary:      Effort: No respiratory distress. Breath sounds: No stridor. No wheezing, rhonchi or rales. Abdominal:      General: There is no distension. Tenderness: There is no abdominal tenderness. There is no rebound. Musculoskeletal:      Comments: No pain over the distribution of right temporal artery   Skin:     General: Skin is warm and dry. Neurological:      General: No focal deficit present. Mental Status: She is alert and oriented to person, place, and time. Cranial Nerves: No cranial nerve deficit. Sensory: No sensory deficit. Motor: No weakness. Coordination: Coordination normal.      Gait: Gait normal.      Deep Tendon Reflexes: Reflexes normal.   Psychiatric:         Mood and Affect: Mood normal.         Behavior: Behavior normal.         Lab and Diagnostic Study Results     Labs -   No results found for this or any previous visit (from the past 12 hour(s)). Radiologic Studies -   @lastxrresult@  CT Results  (Last 48 hours)    None        CXR Results  (Last 48 hours)    None            Medical Decision Making   - I am the first provider for this patient. - I reviewed the vital signs, available nursing notes, past medical history, past surgical history, family history and social history. - Initial assessment performed. The patients presenting problems have been discussed, and they are in agreement with the care plan formulated and outlined with them. I have encouraged them to ask questions as they arise throughout their visit. Vital Signs-Reviewed the patient's vital signs. No data found. ED Course/ Provider Notes (Medical Decision Making):     Patient presented to the emergency department with a chief complaint of headache. No neurovascular deficits. Recent CT scan of the head demonstrated no acute intracranial process. She was given Zofran Benadryl and Toradol with significant improvement of her symptoms. She will follow up with her PCP. Rositatyra Leonarda was given a thorough list of signs and symptoms that would warrant an immediate return to the emergency department. Otherwise Krystle Brower will follow up with PCP. Procedures   Medical Decision Makingedical Decision Making  Performed by: Nara Spears DO  Procedures  None       Disposition   Disposition:     Home     All of the diagnostic tests were reviewed and questions answered. Diagnosis, care plan and treatment options were discussed. The patient understands the instructions and will follow up as directed.  The patients results have been reviewed with them.  They have been counseled regarding their diagnosis. The patient verbally convey understanding and agreement of the signs, symptoms, diagnosis, treatment and prognosis and additionally agrees to follow up as recommended with their PCP in 24 - 48 hours. They also agree with the care-plan and convey that all of their questions have been answered. I have also put together some discharge instructions for them that include: 1) educational information regarding their diagnosis, 2) how to care for their diagnosis at home, as well a 3) list of reasons why they would want to return to the ED prior to their follow-up appointment, should their condition change. DISCHARGE PLAN:    1. Cannot display discharge medications since this patient is not currently admitted. 2.   Follow-up Information     Follow up With Specialties Details Why Contact Info    Jocelyn Cardenas MD [facility] Medicine Schedule an appointment as soon as possible for a visit   [place] [address] [address] [place] [address]  [phone]              3.  Return to ED if worse       4. Discharge Medication List as of 4/4/2021  3:35 PM      START taking these medications    Details   naproxen (NAPROSYN) 500 mg tablet Take 1 Tab by mouth two (2) times daily (with meals) for 15 days. , Normal, Disp-30 Tab, R-0         CONTINUE these medications which have NOT CHANGED    Details   hydroCHLOROthiazide (HYDRODIURIL) 12.5 mg tablet Take 1 Tab by mouth daily. , Normal, Disp-90 Tab, R-1      risperiDONE (RisperDAL) 0.5 mg tablet TAKE 1 TABLET BY MOUTH AT BEDTIME, Historical Med      sertraline (ZOLOFT) 50 mg tablet TAKE 1 TABLET BY MOUTH AT BEDTIME, Historical Med      amitriptyline (ELAVIL) 50 mg tablet 50 mg nightly., Historical Med               Diagnosis     Clinical Impression:    1.  Migraine without status migrainosus, not intractable, unspecified migraine type        Attestations:    Kenneth Espino, DO    Please note that this dictation was completed with Dragon, the computer voice recognition software. Quite often unanticipated grammatical, syntax, homophones, and other interpretive errors are inadvertently transcribed by the computer software. Please disregard these errors. Please excuse any errors that have escaped final proofreading. Thank you.

## 2021-04-10 ENCOUNTER — HOSPITAL ENCOUNTER (EMERGENCY)
Age: 36
Discharge: HOME | End: 2021-04-10
Attending: EMERGENCY MEDICINE
Payer: COMMERCIAL

## 2021-04-10 VITALS
OXYGEN SATURATION: 98 % | DIASTOLIC BLOOD PRESSURE: 86 MMHG | RESPIRATION RATE: 18 BRPM | BODY MASS INDEX: 26.07 KG/M2 | HEART RATE: 106 BPM | WEIGHT: 172 LBS | SYSTOLIC BLOOD PRESSURE: 138 MMHG | HEIGHT: 68 IN | TEMPERATURE: 98 F

## 2021-04-10 DIAGNOSIS — G44.209 TENSION HEADACHE: Primary | ICD-10-CM

## 2021-04-10 PROCEDURE — 99283 EMERGENCY DEPT VISIT LOW MDM: CPT

## 2021-04-10 PROCEDURE — 74011250637 HC RX REV CODE- 250/637: Performed by: EMERGENCY MEDICINE

## 2021-04-10 RX ORDER — BUTALBITAL, ACETAMINOPHEN AND CAFFEINE 300; 40; 50 MG/1; MG/1; MG/1
1 CAPSULE ORAL
Qty: 10 CAP | Refills: 0 | Status: SHIPPED | OUTPATIENT
Start: 2021-04-10 | End: 2021-04-28 | Stop reason: SDDI

## 2021-04-10 RX ORDER — BUTALBITAL, ACETAMINOPHEN AND CAFFEINE 50; 325; 40 MG/1; MG/1; MG/1
1 TABLET ORAL
Status: COMPLETED | OUTPATIENT
Start: 2021-04-10 | End: 2021-04-10

## 2021-04-10 RX ADMIN — BUTALBITAL, ACETAMINOPHEN, AND CAFFEINE 1 TABLET: 50; 325; 40 TABLET ORAL at 20:20

## 2021-04-10 NOTE — ED TRIAGE NOTES
Pt with right headache x 2 to 3 days, states \"want a cat scan to see if my allergies or my muscle or my glasses or something in my head\" given prescription with last visit but has not had it filled

## 2021-04-11 NOTE — DISCHARGE INSTRUCTIONS
Medicines as prescribed and follow-up with your PCP/neurologist for reevaluation and further treatment. Return to the emergency room for any new or worsening symptoms. Avoid caffeinated products, like Mountain Dew, coffee and energy drinks.

## 2021-04-11 NOTE — ED PROVIDER NOTES
EMERGENCY DEPARTMENT HISTORY AND PHYSICAL EXAM      Date: 4/10/2021  Patient Name: Regino Shone    History of Presenting Illness     Chief Complaint   Patient presents with    Headache       History Provided By: Patient    HPI: Regino Shone, 28 y.o. female with a past medical history significant hypertension, asthma and Anemia, fibroids, OCD presents to the ED with cc of mild intermittent headache chronic recurrent. Patient has been seen by a neurologist without any abnormal findings. She has had several ED visits with similar episodes of tension headaches. No other constitutional symptoms. There are no other complaints, changes, or physical findings at this time. PCP: Martinez Quinones MD    No current facility-administered medications on file prior to encounter. Current Outpatient Medications on File Prior to Encounter   Medication Sig Dispense Refill    naproxen (NAPROSYN) 500 mg tablet Take 1 Tab by mouth two (2) times daily (with meals) for 15 days. 30 Tab 0    hydroCHLOROthiazide (HYDRODIURIL) 12.5 mg tablet Take 1 Tab by mouth daily. 90 Tab 1    risperiDONE (RisperDAL) 0.5 mg tablet TAKE 1 TABLET BY MOUTH AT BEDTIME      amitriptyline (ELAVIL) 50 mg tablet 50 mg nightly. Past History     Past Medical History:  Past Medical History:   Diagnosis Date    Anxiety     Asthma     Hypertension     Iron deficiency anemia 9/29/2020    Neck pain 9/29/2020    OCD (obsessive compulsive disorder)     Uterine leiomyoma 9/29/2020       Past Surgical History:  History reviewed. No pertinent surgical history.     Family History:  Family History   Problem Relation Age of Onset    Hypertension Mother        Social History:  Social History     Tobacco Use    Smoking status: Never Smoker    Smokeless tobacco: Never Used   Substance Use Topics    Alcohol use: Not Currently     Frequency: Never    Drug use: Not Currently       Allergies:  No Known Allergies      Review of Systems     Review of Systems   Constitutional: Negative for diaphoresis and fatigue. HENT: Negative for congestion, dental problem, ear discharge and ear pain. Eyes: Negative for discharge and redness. Respiratory: Negative for cough, chest tightness and shortness of breath. Cardiovascular: Negative for chest pain and palpitations. Gastrointestinal: Negative for abdominal pain, constipation, diarrhea, nausea and vomiting. Endocrine: Negative. Genitourinary: Negative. Negative for dysuria and frequency. Musculoskeletal: Negative for arthralgias and myalgias. Skin: Negative. Neurological: Positive for headaches. Negative for dizziness, syncope and light-headedness. Hematological: Negative. Psychiatric/Behavioral: Negative for agitation and behavioral problems. All other systems reviewed and are negative. Physical Exam     Physical Exam  Vitals signs and nursing note reviewed. Constitutional:       Appearance: Normal appearance. She is normal weight. HENT:      Head: Normocephalic and atraumatic. Nose: Nose normal.      Mouth/Throat:      Mouth: Mucous membranes are moist.      Pharynx: Oropharynx is clear. Eyes:      Extraocular Movements: Extraocular movements intact. Conjunctiva/sclera: Conjunctivae normal.      Pupils: Pupils are equal, round, and reactive to light. Neck:      Musculoskeletal: Normal range of motion and neck supple. Cardiovascular:      Rate and Rhythm: Normal rate and regular rhythm. Pulses: Normal pulses. Heart sounds: Normal heart sounds. Pulmonary:      Effort: Pulmonary effort is normal.      Breath sounds: Normal breath sounds. Abdominal:      General: Abdomen is flat. Palpations: Abdomen is soft. Musculoskeletal: Normal range of motion. Skin:     General: Skin is warm and dry. Capillary Refill: Capillary refill takes less than 2 seconds. Neurological:      General: No focal deficit present.       Mental Status: She is alert and oriented to person, place, and time. Psychiatric:         Mood and Affect: Mood normal.         Behavior: Behavior normal.         Lab and Diagnostic Study Results     Labs -   No results found for this or any previous visit (from the past 12 hour(s)). Radiologic Studies -     CT Results  (Last 48 hours)    None        CXR Results  (Last 48 hours)    None            Medical Decision Making   - I am the first provider for this patient. - I reviewed the vital signs, available nursing notes, past medical history, past surgical history, family history and social history. - Initial assessment performed. The patients presenting problems have been discussed, and they are in agreement with the care plan formulated and outlined with them. I have encouraged them to ask questions as they arise throughout their visit. Vital Signs-Reviewed the patient's vital signs. No data found. Records Reviewed: Nursing Notes    ED Course/Provider Notes (Medical Decision Making): Uneventful ED course, clinical improvement with therapy, patient will be discharged to followup with PCP as directed    Disposition     Disposition: Condition stable and improved  DC- Adult Discharges: All of the diagnostic tests were reviewed and questions answered. Diagnosis, care plan and treatment options were discussed. The patient understands the instructions and will follow up as directed. The patients results have been reviewed with them. They have been counseled regarding their diagnosis. The patient verbally convey understanding and agreement of the signs, symptoms, diagnosis, treatment and prognosis and additionally agrees to follow up as recommended with their PCP in 24 - 48 hours. They also agree with the care-plan and convey that all of their questions have been answered.   I have also put together some discharge instructions for them that include: 1) educational information regarding their diagnosis, 2) how to care for their diagnosis at home, as well a 3) list of reasons why they would want to return to the ED prior to their follow-up appointment, should their condition change. Discharged    DISCHARGE PLAN:  1. Current Discharge Medication List      CONTINUE these medications which have NOT CHANGED    Details   naproxen (NAPROSYN) 500 mg tablet Take 1 Tab by mouth two (2) times daily (with meals) for 15 days. Qty: 30 Tab, Refills: 0      hydroCHLOROthiazide (HYDRODIURIL) 12.5 mg tablet Take 1 Tab by mouth daily. Qty: 90 Tab, Refills: 1    Associated Diagnoses: Elevated BP without diagnosis of hypertension      risperiDONE (RisperDAL) 0.5 mg tablet TAKE 1 TABLET BY MOUTH AT BEDTIME      amitriptyline (ELAVIL) 50 mg tablet 50 mg nightly. 2.   Follow-up Information     Follow up With Specialties Details Why Contact Info    aKylie Valenzuela MD Family Medicine In 2 days  [address]  [phone]          3. Return to ED if worse   4. Discharge Medication List as of 4/10/2021  9:00 PM      START taking these medications    Details   butalbital-acetaminophen-caff (Fioricet) -40 mg per capsule Take 1 Cap by mouth every four (4) hours as needed for Headache., Normal, Disp-10 Cap, R-0         CONTINUE these medications which have NOT CHANGED    Details   naproxen (NAPROSYN) 500 mg tablet Take 1 Tab by mouth two (2) times daily (with meals) for 15 days. , Normal, Disp-30 Tab, R-0      hydroCHLOROthiazide (HYDRODIURIL) 12.5 mg tablet Take 1 Tab by mouth daily. , Normal, Disp-90 Tab, R-1      risperiDONE (RisperDAL) 0.5 mg tablet TAKE 1 TABLET BY MOUTH AT BEDTIME, Historical Med      amitriptyline (ELAVIL) 50 mg tablet 50 mg nightly., Historical Med               Diagnosis     Clinical Impression:   1. Tension headache        Attestations:    Nusrat Penny MD    Please note that this dictation was completed with [employer], the [employer] voice recognition software.   Quite often unanticipated grammatical, syntax, homophones, and other interpretive errors are inadvertently transcribed by the computer software. Please disregard these errors. Please excuse any errors that have escaped final proofreading. Thank you.

## 2021-04-19 ENCOUNTER — VIRTUAL VISIT (OUTPATIENT)
Dept: FAMILY MEDICINE CLINIC | Age: 36
End: 2021-04-19
Payer: COMMERCIAL

## 2021-04-19 DIAGNOSIS — F41.9 ANXIETY: ICD-10-CM

## 2021-04-19 DIAGNOSIS — R42 LIGHTHEADED: ICD-10-CM

## 2021-04-19 DIAGNOSIS — R11.0 NAUSEA: Primary | ICD-10-CM

## 2021-04-19 PROCEDURE — 99214 OFFICE O/P EST MOD 30 MIN: CPT | Performed by: NURSE PRACTITIONER

## 2021-04-19 RX ORDER — ONDANSETRON 8 MG/1
8 TABLET, ORALLY DISINTEGRATING ORAL
Qty: 15 TAB | Refills: 1 | Status: SHIPPED | OUTPATIENT
Start: 2021-04-19

## 2021-04-19 RX ORDER — PANTOPRAZOLE SODIUM 40 MG/1
TABLET, DELAYED RELEASE ORAL
Qty: 90 TAB | Refills: 1 | Status: SHIPPED | OUTPATIENT
Start: 2021-04-19 | End: 2021-04-28 | Stop reason: SDDI

## 2021-04-19 NOTE — PROGRESS NOTES
I  do attest that this note was reviewed and I was available for  DIDIER PARHAM in this day of service and will follow along with DIDIER Ng.      Ressie Running, DO

## 2021-04-19 NOTE — PROGRESS NOTES
Kelli Echevarria (: 1985) is a 28 y.o. female, established patient, here for evaluation of the following chief complaint(s):   Nausea, Dizziness, and Fatigue       ASSESSMENT/PLAN:  1. Nausea  -     ondansetron (ZOFRAN ODT) 8 mg disintegrating tablet; Take 1 Tab by mouth every eight (8) hours as needed for Nausea or Vomiting., Normal, Disp-15 Tab, R-1  -     pantoprazole (PROTONIX) 40 mg tablet; Take 1 tablet by mouth in am daily as needed. Wait 30 min after taking to eat or take other medication. , Normal, Disp-90 Tab, R-1  2. Lightheaded  3. Anxiety    No follow-ups on file. 1. Nausea  We will try medication for nausea control. Advised to take the medication every 8 hours today and to consume clear liquids such as apple juice, Jell-O and chicken broth. Advance diet as tolerated. Also wondering if her symptoms could be related to undiagnosed GERD. Patient agreeable to a trial of a PPI to see if makes a difference in the nausea. - ondansetron (ZOFRAN ODT) 8 mg disintegrating tablet; Take 1 Tab by mouth every eight (8) hours as needed for Nausea or Vomiting. Dispense: 15 Tab; Refill: 1  - pantoprazole (PROTONIX) 40 mg tablet; Take 1 tablet by mouth in am daily as needed. Wait 30 min after taking to eat or take other medication. Dispense: 90 Tab; Refill: 1    2. Lightheaded  Patient will get labs done today for iron and thyroid check. Wondering if the lightheadedness could be a symptom of her anxiety. 3. Anxiety  Patient will continue regular follow-up with her psychiatrist for medical management. I also encouraged her to resume her Risperdal.      SUBJECTIVE/OBJECTIVE:  HPI:    Patient presented with complaints of nausea, dizziness, feeling weak, jittery, shaky, and \"feels weird. \"  Dizziness is not a new symptom for her. Review of her record indicated two recent emergency room visits-2021 and 4/10/2021.   She had complaints of headache on the right side of her head at both encounters. Also had complaints of weakness and fatigue at the 4/4/2021 ER visit. Patient not complaining of a headache today. Unclear how well-hydrated patient is. Stated she has been drinking adequate amounts of fluids- several bottles a day. Stated she has been eating but has not been finishing her food in the last few days. She has mental health diagnoses of OCD and anxiety. She stated that she has stopped taking her Risperdal as she feels it \" slows her down. \"  Stated she saw her psychiatrist a few days ago and informed the provider that she was no longer taking  Risperdal.  Patient stated she was told to try taking it again. So far patient has not resumed medication. Patient was seen in office for an appointment on 3/12/2021 for headache. She has been seen by neurology and has had a CAT scan in recent past  which was unremarkable. She was ordered labs but so far has not completed. Wanted to check her iron levels and thyroid levels to make sure there was no etiology pertinent to her symptoms. Encouraged to get her lab work done today or ASAP. Review of Systems   Constitutional: Positive for fatigue. Negative for chills and fever. HENT: Negative for congestion, ear pain, postnasal drip, sinus pressure, sinus pain and sore throat. Eyes: Negative for pain and itching. Respiratory: Negative for cough, shortness of breath and wheezing. Cardiovascular: Negative for chest pain and palpitations. Gastrointestinal: Positive for nausea and vomiting. Negative for abdominal pain and diarrhea. Vomited liquid   Genitourinary: Negative for dysuria and frequency. Musculoskeletal: Negative for arthralgias and myalgias. Skin: Negative for rash. Neurological: Positive for light-headedness and headaches. Negative for dizziness, weakness and numbness. Psychiatric/Behavioral: Negative for dysphoric mood and sleep disturbance. The patient is not nervous/anxious.        No flowsheet data found.    Physical Exam  Constitutional:       General: She is not in acute distress. Appearance: Normal appearance. HENT:      Head: Normocephalic. Right Ear: External ear normal.      Left Ear: External ear normal.      Nose: Nose normal.   Eyes:      Conjunctiva/sclera: Conjunctivae normal.   Neck:      Musculoskeletal: Neck supple. Pulmonary:      Effort: Pulmonary effort is normal.      Breath sounds: No wheezing. Musculoskeletal: Normal range of motion. Comments: Of visible extremities. Skin:     Coloration: Skin is not jaundiced. Findings: No rash. Neurological:      Mental Status: She is alert and oriented to person, place, and time. Psychiatric:         Behavior: Behavior normal.         Thought Content: Thought content normal.         Judgment: Judgment normal.      Comments: Fidgeting during appt. Sonny Rey, was evaluated through a synchronous (real-time) audio-video encounter. The patient (or guardian if applicable) is aware that this is a billable service. Verbal consent to proceed has been obtained within the past 12 months. The visit was conducted pursuant to the emergency declaration under the [address] [place] [address] authority and the [employer] General Act. Patient identification was verified, and a caregiver was present when appropriate. The patient was located in a state where the provider was credentialed to provide care. An electronic signature was used to authenticate this note.   -- Suzanne Gordon NP

## 2021-04-20 ENCOUNTER — TELEPHONE (OUTPATIENT)
Dept: FAMILY MEDICINE CLINIC | Age: 36
End: 2021-04-20

## 2021-04-20 LAB
FERRITIN SERPL-MCNC: 23 NG/ML (ref 15–150)
HCV AB S/CO SERPL IA: <0.1 S/CO RATIO (ref 0–0.9)
HCV AB SERPL QL IA: NORMAL
IRON SATN MFR SERPL: 31 % (ref 15–55)
IRON SERPL-MCNC: 89 UG/DL (ref 27–159)
T4 FREE SERPL-MCNC: 1.11 NG/DL (ref 0.82–1.77)
TIBC SERPL-MCNC: 287 UG/DL (ref 250–450)
TSH SERPL DL<=0.005 MIU/L-ACNC: 1.41 UIU/ML (ref 0.45–4.5)
UIBC SERPL-MCNC: 198 UG/DL (ref 131–425)

## 2021-04-21 ENCOUNTER — HOSPITAL ENCOUNTER (EMERGENCY)
Age: 36
Discharge: HOME | End: 2021-04-21
Payer: COMMERCIAL

## 2021-04-21 VITALS
WEIGHT: 172 LBS | HEIGHT: 68 IN | DIASTOLIC BLOOD PRESSURE: 81 MMHG | SYSTOLIC BLOOD PRESSURE: 142 MMHG | OXYGEN SATURATION: 100 % | TEMPERATURE: 98.1 F | HEART RATE: 88 BPM | BODY MASS INDEX: 26.07 KG/M2 | RESPIRATION RATE: 18 BRPM

## 2021-04-21 DIAGNOSIS — R51.9 SCALP PAIN: Primary | ICD-10-CM

## 2021-04-21 PROCEDURE — 99283 EMERGENCY DEPT VISIT LOW MDM: CPT

## 2021-04-21 NOTE — ED NOTES
6:41 PM    Reviewed patient's discharge information with patient who verbalized understanding of d/c instruction and follow up. No complaints, no acute distress noted. Self ambulated to waiting room to be d/c home.

## 2021-04-21 NOTE — ED PROVIDER NOTES
EMERGENCY DEPARTMENT HISTORY AND PHYSICAL EXAM      Date: 4/21/2021  Patient Name: Jensen Bains    History of Presenting Illness     Chief Complaint   Patient presents with    Other       History Provided By: Patient and Patient's Mother    HPI: Jensen Bains, 28 y.o. female with a past medical history significant OCD, anxiety presents to the ED with cc of left-sided spot on her head that \"feels weird\" onset yesterday. Patient states that it is not a pain, not a headache, but a spot on her scalp that just feels strange. Patient with normal CT scan of the head 1 month ago. Has been seen in this ED multiple times for headache and diagnosed with tension/migraine/cervicogenic headache. No neurology follow up. She specifically denies numbness, tingling, neck pain, head injury, fever, new hair dye, rash, eye pain, ear pain, achiness, sharp pain, trouble breathing. There are no other complaints, changes, or physical findings at this time. PCP: Ciarra Galvez MD    No current facility-administered medications on file prior to encounter. Current Outpatient Medications on File Prior to Encounter   Medication Sig Dispense Refill    ondansetron (ZOFRAN ODT) 8 mg disintegrating tablet Take 1 Tab by mouth every eight (8) hours as needed for Nausea or Vomiting. 15 Tab 1    pantoprazole (PROTONIX) 40 mg tablet Take 1 tablet by mouth in am daily as needed. Wait 30 min after taking to eat or take other medication. 90 Tab 1    butalbital-acetaminophen-caff (Fioricet) -40 mg per capsule Take 1 Cap by mouth every four (4) hours as needed for Headache. 10 Cap 0    hydroCHLOROthiazide (HYDRODIURIL) 12.5 mg tablet Take 1 Tab by mouth daily. 90 Tab 1    risperiDONE (RisperDAL) 0.5 mg tablet TAKE 1 TABLET BY MOUTH AT BEDTIME      amitriptyline (ELAVIL) 50 mg tablet 50 mg nightly.          Past History     Past Medical History:  Past Medical History:   Diagnosis Date    Anxiety     Asthma     Hypertension     Iron deficiency anemia 9/29/2020    Neck pain 9/29/2020    OCD (obsessive compulsive disorder)     Uterine leiomyoma 9/29/2020       Past Surgical History:  No past surgical history on file. Family History:  Family History   Problem Relation Age of Onset    Hypertension Mother        Social History:  Social History     Tobacco Use    Smoking status: Never Smoker    Smokeless tobacco: Never Used   Substance Use Topics    Alcohol use: Not Currently     Frequency: Never    Drug use: Not Currently       Allergies:  No Known Allergies      Review of Systems   Review of Systems   Constitutional: Negative for activity change, chills and fever. HENT: Negative for congestion, ear pain, rhinorrhea, sneezing and sore throat. Eyes: Negative for pain and visual disturbance. Respiratory: Negative for cough and shortness of breath. Cardiovascular: Negative for chest pain. Gastrointestinal: Negative for abdominal pain, diarrhea, nausea and vomiting. Genitourinary: Negative for dysuria and hematuria. Musculoskeletal: Negative for gait problem, myalgias, neck pain and neck stiffness. Skin: Negative for rash. \"scalp pain\"   Neurological: Negative for dizziness, speech difficulty, weakness and headaches. Psychiatric/Behavioral: The patient is not nervous/anxious. All other systems reviewed and are negative. Physical Exam   Physical Exam  Vitals signs and nursing note reviewed. Constitutional:       General: She is not in acute distress. Appearance: Normal appearance. She is not ill-appearing or toxic-appearing. HENT:      Head: Normocephalic and atraumatic. No masses. Hair is normal.      Nose: Nose normal.      Mouth/Throat:      Mouth: Mucous membranes are moist.      Pharynx: Oropharynx is clear. No pharyngeal swelling or posterior oropharyngeal erythema. Eyes:      Extraocular Movements: Extraocular movements intact.       Conjunctiva/sclera: Conjunctivae normal.      Pupils: Pupils are equal, round, and reactive to light. Neck:      Musculoskeletal: Normal range of motion and neck supple. No spinous process tenderness or muscular tenderness. Cardiovascular:      Rate and Rhythm: Normal rate. Pulses: Normal pulses. Pulmonary:      Effort: Pulmonary effort is normal. No respiratory distress. Musculoskeletal: Normal range of motion. General: No deformity or signs of injury. Lymphadenopathy:      Cervical: No cervical adenopathy. Skin:     General: Skin is warm and dry. Capillary Refill: Capillary refill takes less than 2 seconds. Findings: No abrasion, ecchymosis, signs of injury or rash. Rash is not crusting. Comments: Scalp: No skin lesions, no rash, no tenderness, no erythema, no swelling   Neurological:      General: No focal deficit present. Mental Status: She is alert and oriented to person, place, and time. GCS: GCS eye subscore is 4. GCS verbal subscore is 5. GCS motor subscore is 6. Cranial Nerves: No cranial nerve deficit. Sensory: Sensation is intact. Motor: Motor function is intact. Coordination: Coordination is intact. Gait: Gait is intact. Psychiatric:         Mood and Affect: Mood normal.         Diagnostic Study Results     Labs -   No results found for this or any previous visit (from the past 48 hour(s)). Radiologic Studies -   No results found for this or any previous visit. CT Results  (Last 48 hours)    None          Medical Decision Making   I am the first provider for this patient. I reviewed the vital signs, available nursing notes, past medical history, past surgical history, family history and social history. Vital Signs-Reviewed the patient's vital signs.   Patient Vitals for the past 12 hrs:   Temp Pulse Resp BP SpO2   04/21/21 1746    (!) 142/81    04/21/21 1745 98.1 °F (36.7 °C) 88 18  100 %       Records Reviewed: Nursing Notes and Old Medical Records    Provider Notes (Medical Decision Making):     MDM  Number of Diagnoses or Management Options  Scalp pain  Diagnosis management comments: 40-year-old female presenting with a strange feeling to the left parietal scalp area. No trauma. Physical exam completely normal.  Examination of the head and scalp without any findings. Patient has been advised to follow-up with neurology if her symptoms continue as she is also been seen for multiple headaches in the past.  CT imaging of the head not indicated at this time. Amount and/or Complexity of Data Reviewed  Review and summarize past medical records: yes        ED Course:   Initial assessment performed. The patients presenting problems have been discussed, and they are in agreement with the care plan formulated and outlined with them. I have encouraged them to ask questions as they arise throughout their visit. PROCEDURES    Procedures       Disposition     Disposition: DC- Adult Discharges: All of the diagnostic tests were reviewed and questions answered. Diagnosis, care plan and treatment options were discussed. The patient understands the instructions and will follow up as directed. The patients results have been reviewed with them. They have been counseled regarding their diagnosis. The patient verbally convey understanding and agreement of the signs, symptoms, diagnosis, treatment and prognosis and additionally agrees to follow up as recommended with their PCP in 24 - 48 hours. They also agree with the care-plan and convey that all of their questions have been answered. I have also put together some discharge instructions for them that include: 1) educational information regarding their diagnosis, 2) how to care for their diagnosis at home, as well a 3) list of reasons why they would want to return to the ED prior to their follow-up appointment, should their condition change. Discharged    DISCHARGE PLAN:  1.    Current Discharge Medication List CONTINUE these medications which have NOT CHANGED    Details   ondansetron (ZOFRAN ODT) 8 mg disintegrating tablet Take 1 Tab by mouth every eight (8) hours as needed for Nausea or Vomiting. Qty: 15 Tab, Refills: 1    Associated Diagnoses: Nausea      pantoprazole (PROTONIX) 40 mg tablet Take 1 tablet by mouth in am daily as needed. Wait 30 min after taking to eat or take other medication. Qty: 90 Tab, Refills: 1    Associated Diagnoses: Nausea      butalbital-acetaminophen-caff (Fioricet) -40 mg per capsule Take 1 Cap by mouth every four (4) hours as needed for Headache. Qty: 10 Cap, Refills: 0      hydroCHLOROthiazide (HYDRODIURIL) 12.5 mg tablet Take 1 Tab by mouth daily. Qty: 90 Tab, Refills: 1    Associated Diagnoses: Elevated BP without diagnosis of hypertension      risperiDONE (RisperDAL) 0.5 mg tablet TAKE 1 TABLET BY MOUTH AT BEDTIME      amitriptyline (ELAVIL) 50 mg tablet 50 mg nightly. 2.   Follow-up Information     Follow up With Specialties Details Why Contact Info    Yannick Ruiz MD [facility] Medicine Schedule an appointment as soon as possible for a visit  for follow up from ER visit [facility] [address] [facility] [address] [phone] [address] [facility] EMERGENCY DEPT Emergency Medicine  As needed, If symptoms worsen [address] [facility] [address] [phone]        3. Return to ED if worse   4. Current Discharge Medication List          Diagnosis     Clinical Impression:   1.  Scalp pain

## 2021-04-21 NOTE — ED TRIAGE NOTES
Patient states there is a spot on the left side of her head that hurts, states it is not  A headache but it feels weird and wants it checked out

## 2021-04-23 ENCOUNTER — HOSPITAL ENCOUNTER (EMERGENCY)
Age: 36
Discharge: HOME | End: 2021-04-23
Attending: STUDENT IN AN ORGANIZED HEALTH CARE EDUCATION/TRAINING PROGRAM
Payer: COMMERCIAL

## 2021-04-23 VITALS
HEIGHT: 68 IN | RESPIRATION RATE: 18 BRPM | BODY MASS INDEX: 26.07 KG/M2 | HEART RATE: 91 BPM | SYSTOLIC BLOOD PRESSURE: 128 MMHG | WEIGHT: 172 LBS | TEMPERATURE: 99.4 F | OXYGEN SATURATION: 100 % | DIASTOLIC BLOOD PRESSURE: 88 MMHG

## 2021-04-23 DIAGNOSIS — F41.9 ACUTE ANXIETY: Primary | ICD-10-CM

## 2021-04-23 LAB
ALBUMIN SERPL-MCNC: 4 G/DL (ref 3.5–5)
ALBUMIN/GLOB SERPL: 0.9 {RATIO} (ref 1.1–2.2)
ALP SERPL-CCNC: 51 U/L (ref 45–117)
ALT SERPL-CCNC: 22 U/L (ref 12–78)
AMPHET UR QL SCN: NEGATIVE
ANION GAP SERPL CALC-SCNC: 5 MMOL/L (ref 5–15)
APPEARANCE UR: CLEAR
AST SERPL W P-5'-P-CCNC: 13 U/L (ref 15–37)
BACTERIA URNS QL MICRO: NEGATIVE /HPF
BARBITURATES UR QL SCN: NEGATIVE
BASOPHILS # BLD: 0 K/UL (ref 0–0.1)
BASOPHILS NFR BLD: 0 % (ref 0–1)
BENZODIAZ UR QL: NEGATIVE
BILIRUB SERPL-MCNC: 0.4 MG/DL (ref 0.2–1)
BILIRUB UR QL: NEGATIVE
BUN SERPL-MCNC: 6 MG/DL (ref 6–20)
BUN/CREAT SERPL: 9 (ref 12–20)
CA-I BLD-MCNC: 9.6 MG/DL (ref 8.5–10.1)
CANNABINOIDS UR QL SCN: NEGATIVE
CHLORIDE SERPL-SCNC: 108 MMOL/L (ref 97–108)
CO2 SERPL-SCNC: 25 MMOL/L (ref 21–32)
COCAINE UR QL SCN: NEGATIVE
COLOR UR: ABNORMAL
CREAT SERPL-MCNC: 0.65 MG/DL (ref 0.55–1.02)
DIFFERENTIAL METHOD BLD: ABNORMAL
DRUG SCRN COMMENT,DRGCM: NORMAL
EOSINOPHIL # BLD: 0 K/UL (ref 0–0.4)
EOSINOPHIL NFR BLD: 1 % (ref 0–7)
ERYTHROCYTE [DISTWIDTH] IN BLOOD BY AUTOMATED COUNT: 12.7 % (ref 11.5–14.5)
GLOBULIN SER CALC-MCNC: 4.4 G/DL (ref 2–4)
GLUCOSE SERPL-MCNC: 102 MG/DL (ref 65–100)
GLUCOSE UR STRIP.AUTO-MCNC: NEGATIVE MG/DL
HCT VFR BLD AUTO: 39.8 % (ref 35–47)
HGB BLD-MCNC: 13.4 G/DL (ref 11.5–16)
HGB UR QL STRIP: ABNORMAL
IMM GRANULOCYTES # BLD AUTO: 0 K/UL (ref 0–0.04)
IMM GRANULOCYTES NFR BLD AUTO: 0 % (ref 0–0.5)
KETONES UR QL STRIP.AUTO: NEGATIVE MG/DL
LEUKOCYTE ESTERASE UR QL STRIP.AUTO: NEGATIVE
LYMPHOCYTES # BLD: 1.5 K/UL (ref 0.8–3.5)
LYMPHOCYTES NFR BLD: 43 % (ref 12–49)
MCH RBC QN AUTO: 30.3 PG (ref 26–34)
MCHC RBC AUTO-ENTMCNC: 33.7 G/DL (ref 30–36.5)
MCV RBC AUTO: 90 FL (ref 80–99)
METHADONE UR QL: NEGATIVE
MONOCYTES # BLD: 0.3 K/UL (ref 0–1)
MONOCYTES NFR BLD: 9 % (ref 5–13)
NEUTS SEG # BLD: 1.6 K/UL (ref 1.8–8)
NEUTS SEG NFR BLD: 47 % (ref 32–75)
NITRITE UR QL STRIP.AUTO: NEGATIVE
OPIATES UR QL: NEGATIVE
PCP UR QL: NEGATIVE
PH UR STRIP: 7 [PH] (ref 5–8)
PLATELET # BLD AUTO: 224 K/UL (ref 150–400)
PMV BLD AUTO: 11.3 FL (ref 8.9–12.9)
POTASSIUM SERPL-SCNC: 3.7 MMOL/L (ref 3.5–5.1)
PROT SERPL-MCNC: 8.4 G/DL (ref 6.4–8.2)
PROT UR STRIP-MCNC: NEGATIVE MG/DL
RBC # BLD AUTO: 4.42 M/UL (ref 3.8–5.2)
RBC #/AREA URNS HPF: ABNORMAL /HPF (ref 0–5)
SODIUM SERPL-SCNC: 138 MMOL/L (ref 136–145)
SP GR UR REFRACTOMETRY: 1.01 (ref 1–1.03)
UROBILINOGEN UR QL STRIP.AUTO: 0.1 EU/DL (ref 0.1–1)
WBC # BLD AUTO: 3.4 K/UL (ref 3.6–11)
WBC URNS QL MICRO: ABNORMAL /HPF (ref 0–4)

## 2021-04-23 PROCEDURE — 36415 COLL VENOUS BLD VENIPUNCTURE: CPT

## 2021-04-23 PROCEDURE — 81001 URINALYSIS AUTO W/SCOPE: CPT

## 2021-04-23 PROCEDURE — 80307 DRUG TEST PRSMV CHEM ANLYZR: CPT

## 2021-04-23 PROCEDURE — 80053 COMPREHEN METABOLIC PANEL: CPT

## 2021-04-23 PROCEDURE — 99283 EMERGENCY DEPT VISIT LOW MDM: CPT

## 2021-04-23 PROCEDURE — 85025 COMPLETE CBC W/AUTO DIFF WBC: CPT

## 2021-04-23 RX ORDER — ALPRAZOLAM 0.25 MG/1
0.25 TABLET ORAL
Qty: 12 TAB | Refills: 0 | Status: SHIPPED | OUTPATIENT
Start: 2021-04-23 | End: 2021-04-28 | Stop reason: SDDI

## 2021-04-23 NOTE — BSMART NOTE
BH Intake: 
 
Pt assessed face to face in ED rm 24. Pt is 28year old [de-identified] female presenting seated in hospital bed in plain clothes. Pt presents with broad affect, anxious mood per report, racing thought process, paranoid and anxious thought content. Pt denying any SI/HI/AVH. Pt reports that her medication was changed on April 3, reports that she has been feeling anxious and on edge since. Pt reports that she has been paranoid that she is going to die. Pt denies any SI/HI/AVH, reports that she just feels like she will die. Pt reports some depression, anxiety, racing thoughts, appetite decrease and paranoia. Pt denying any other MH symptoms. Pt reports that she has diagnosis of anxiety and OCD. Pt denies any inpatient hospitalization. Pt reports that she sees Dr. Kary Rodriguez at [facility] for psychiatry and Janki Sol at [facility] for therapy. Pt reports that she is prescribed risperidone 0.5 mg, reports that she as also taking zoloft but was recently taken off. Pt denies any familial MH concerns  Pt reports that she has high blood pressure, denies any allergies. Pt denies any substance use, denies any legal concerns. Pt reports that she lives with her mother and brother. Pt reports that her family is a good support system. Pt denies any hx of trauma or abuse. Dr. Bonifacio Hauser notified, reports pt is not meeting criteria for admission, reports for pt to follow up with psychiatrist outpatient, reports to give short dose of xanax . 25 BID, 10 tablets. Dr. Sukumar Huber notified.

## 2021-04-23 NOTE — ED TRIAGE NOTES
Pt arrives by EMS from work. HR department called EMS as Pt was irritable, yelling and demonstrating SI.  Pt recently had medication change and was seen here recently

## 2021-04-23 NOTE — ED PROVIDER NOTES
EMERGENCY DEPARTMENT HISTORY AND PHYSICAL EXAM      Date: 4/23/2021  Patient Name: Shaq Colvin    History of Presenting Illness     Chief Complaint   Patient presents with   [address] Problem       History Provided By: Patient    HPI: Shaq Colvin, 28 y.o. female with a past medical history significant Anxiety disorder, OCD presents to the ED with cc of acute feeling of anxiety, panic attack while at work today. Patient states she has a history of anxiety, states she was at work when felt suddenly very anxious, denies any chest pain no shortness of breath. Denies any medical complaints at this time no chest pain palpitations no shortness of breath. There are no other complaints, changes, or physical findings at this time. PCP: Sarah Fang NP    No current facility-administered medications on file prior to encounter. Current Outpatient Medications on File Prior to Encounter   Medication Sig Dispense Refill    ondansetron (ZOFRAN ODT) 8 mg disintegrating tablet Take 1 Tab by mouth every eight (8) hours as needed for Nausea or Vomiting. 15 Tab 1    pantoprazole (PROTONIX) 40 mg tablet Take 1 tablet by mouth in am daily as needed. Wait 30 min after taking to eat or take other medication. 90 Tab 1    butalbital-acetaminophen-caff (Fioricet) -40 mg per capsule Take 1 Cap by mouth every four (4) hours as needed for Headache. 10 Cap 0    hydroCHLOROthiazide (HYDRODIURIL) 12.5 mg tablet Take 1 Tab by mouth daily. 90 Tab 1    risperiDONE (RisperDAL) 0.5 mg tablet TAKE 1 TABLET BY MOUTH AT BEDTIME      amitriptyline (ELAVIL) 50 mg tablet 50 mg nightly. Past History     Past Medical History:  Past Medical History:   Diagnosis Date    Anxiety     Asthma     Hypertension     Iron deficiency anemia 9/29/2020    Neck pain 9/29/2020    OCD (obsessive compulsive disorder)     Uterine leiomyoma 9/29/2020       Past Surgical History:  No past surgical history on file.     Family History:  Family History   Problem Relation Age of Onset    Hypertension Mother        Social History:  Social History     Tobacco Use    Smoking status: Never Smoker    Smokeless tobacco: Never Used   Substance Use Topics    Alcohol use: Not Currently     Frequency: Never    Drug use: Not Currently       Allergies:  No Known Allergies      Review of Systems     Review of Systems   Constitutional: Negative for activity change, chills and fever. HENT: Negative for congestion and sore throat. Eyes: Negative for photophobia and visual disturbance. Respiratory: Negative for apnea, chest tightness and shortness of breath. Cardiovascular: Negative for chest pain, palpitations and leg swelling. Gastrointestinal: Negative for abdominal pain, blood in stool, nausea and vomiting. Genitourinary: Negative for dysuria. Musculoskeletal: Negative for arthralgias and back pain. Skin: Negative for color change. Neurological: Negative for dizziness, weakness, numbness and headaches. Psychiatric/Behavioral: The patient is nervous/anxious. Physical Exam     Physical Exam  Vitals signs and nursing note reviewed. Constitutional:       General: She is not in acute distress. Appearance: Normal appearance. She is normal weight. She is not ill-appearing. HENT:      Head: Normocephalic and atraumatic. Nose: Nose normal.      Mouth/Throat:      Mouth: Mucous membranes are moist.   Eyes:      Extraocular Movements: Extraocular movements intact. Pupils: Pupils are equal, round, and reactive to light. Neck:      Musculoskeletal: Normal range of motion and neck supple. No muscular tenderness. Cardiovascular:      Rate and Rhythm: Normal rate and regular rhythm. Pulses: Normal pulses. Pulmonary:      Effort: Pulmonary effort is normal.   Abdominal:      General: Abdomen is flat. Bowel sounds are normal.      Palpations: Abdomen is soft. Tenderness: There is no abdominal tenderness. There is no guarding. Musculoskeletal: Normal range of motion. General: No tenderness. Skin:     General: Skin is warm and dry. Neurological:      General: No focal deficit present. Mental Status: She is alert and oriented to person, place, and time. Sensory: No sensory deficit. Motor: No weakness. Diagnostic Study Results     Labs -     Recent Results (from the past 12 hour(s))   CBC WITH AUTOMATED DIFF    Collection Time: 04/23/21  9:15 AM   Result Value Ref Range    WBC 3.4 (L) 3.6 - 11.0 K/uL    RBC 4.42 3.80 - 5.20 M/uL    HGB 13.4 11.5 - 16.0 g/dL    HCT 39.8 35.0 - 47.0 %    MCV 90.0 80.0 - 99.0 FL    MCH 30.3 26.0 - 34.0 PG    MCHC 33.7 30.0 - 36.5 g/dL    RDW 12.7 11.5 - 14.5 %    PLATELET 546 025 - 146 K/uL    MPV 11.3 8.9 - 12.9 FL    NEUTROPHILS 47 32 - 75 %    LYMPHOCYTES 43 12 - 49 %    MONOCYTES 9 5 - 13 %    EOSINOPHILS 1 0 - 7 %    BASOPHILS 0 0 - 1 %    IMMATURE GRANULOCYTES 0 0.0 - 0.5 %    ABS. NEUTROPHILS 1.6 (L) 1.8 - 8.0 K/UL    ABS. LYMPHOCYTES 1.5 0.8 - 3.5 K/UL    ABS. MONOCYTES 0.3 0.0 - 1.0 K/UL    ABS. EOSINOPHILS 0.0 0.0 - 0.4 K/UL    ABS. BASOPHILS 0.0 0.0 - 0.1 K/UL    ABS. IMM. GRANS. 0.0 0.00 - 0.04 K/UL    DF AUTOMATED     METABOLIC PANEL, COMPREHENSIVE    Collection Time: 04/23/21  9:15 AM   Result Value Ref Range    Sodium 138 136 - 145 mmol/L    Potassium 3.7 3.5 - 5.1 mmol/L    Chloride 108 97 - 108 mmol/L    CO2 25 21 - 32 mmol/L    Anion gap 5 5 - 15 mmol/L    Glucose 102 (H) 65 - 100 mg/dL    BUN 6 6 - 20 mg/dL    Creatinine 0.65 0.55 - 1.02 mg/dL    BUN/Creatinine ratio 9 (L) 12 - 20      GFR est AA >60 >60 ml/min/1.73m2    GFR est non-AA >60 >60 ml/min/1.73m2    Calcium 9.6 8.5 - 10.1 mg/dL    Bilirubin, total 0.4 0.2 - 1.0 mg/dL    AST (SGOT) 13 (L) 15 - 37 U/L    ALT (SGPT) 22 12 - 78 U/L    Alk.  phosphatase 51 45 - 117 U/L    Protein, total 8.4 (H) 6.4 - 8.2 g/dL    Albumin 4.0 3.5 - 5.0 g/dL    Globulin 4.4 (H) 2.0 - 4.0 g/dL    A-G Ratio 0.9 (L) 1.1 - 2.2     DRUG SCREEN, URINE    Collection Time: 04/23/21  9:15 AM   Result Value Ref Range    AMPHETAMINES Negative Negative      BARBITURATES Negative Negative      BENZODIAZEPINES Negative Negative      COCAINE Negative Negative      METHADONE Negative Negative      OPIATES Negative Negative      PCP(PHENCYCLIDINE) Negative Negative      THC (TH-CANNABINOL) Negative Negative      Drug screen comment        This test is a screen for drugs of abuse in a medical setting only (i.e., they are unconfirmed results and as such must not be used for non-medical purposes, e.g.,employment testing, legal testing). Due to its inherent nature, false positive (FP) and false negative (FN) results may be obtained. Therefore, if necessary for medical care, recommend confirmation of positive findings by GC/MS. URINALYSIS W/MICROSCOPIC    Collection Time: 04/23/21  9:15 AM   Result Value Ref Range    Color Yellow/Straw      Appearance Clear Clear      Specific gravity 1.010 1.003 - 1.030      pH (UA) 7.0 5.0 - 8.0      Protein Negative Negative mg/dL    Glucose Negative Negative mg/dL    Ketone Negative Negative mg/dL    Bilirubin Negative Negative      Blood Large (A) Negative      Urobilinogen 0.1 0.1 - 1.0 EU/dL    Nitrites Negative Negative      Leukocyte Esterase Negative Negative      WBC 0-4 0 - 4 /hpf    RBC 0-5 0 - 5 /hpf    Bacteria Negative Negative /hpf       Radiologic Studies -   @lastxrresult@  CT Results  (Last 48 hours)    None        CXR Results  (Last 48 hours)    None            Medical Decision Making   I am the first provider for this patient. I reviewed the vital signs, available nursing notes, past medical history, past surgical history, family history and social history. Vital Signs-Reviewed the patient's vital signs.   Patient Vitals for the past 12 hrs:   Temp Pulse Resp BP SpO2   04/23/21 0915 99.4 °F (37.4 °C) 91 18 128/88 100 %       Records Reviewed: Nursing Notes    The patient presents with anxiety with a differential diagnosis of acute anxiety reaction, panic disorder, bipolar disorder      Provider Notes (Medical Decision Making):     MDM   69-year-old female, past medical history of anxiety presents to emergency department complaining of acute anxiety while at work today, denies any chest pain, shortness of breath no weakness dizziness. Physical exam unremarkable, patient denies any SI HI, denies any drug use. ED Course:   Initial assessment performed. The patients presenting problems have been discussed, and they are in agreement with the care plan formulated and outlined with them. I have encouraged them to ask questions as they arise throughout their visit. ED Course as of Apr 23 1555   Fri Apr 23, 2021   1039 Patient's lab work reviewed, no gross abnormalities, U tox negative, behavioral health evaluated patient, currently does not meet inpatient criteria however recommending discharge to follow-up with primary psychiatrist, recommends discharge with short course of Xanax. Will write prescription for patient, discussed plan with patient, amenable with discharge. [PZ]      ED Course User Index  [PZ] Panda Medrano MD         PROCEDURES  Procedures         PLAN:  1. Discharge Medication List as of 4/23/2021 10:55 AM      START taking these medications    Details   ALPRAZolam (Xanax) 0.25 mg tablet Take 1 Tab by mouth every eight (8) hours as needed for Anxiety. Max Daily Amount: 0.75 mg., Normal, Disp-12 Tab, R-0         CONTINUE these medications which have NOT CHANGED    Details   ondansetron (ZOFRAN ODT) 8 mg disintegrating tablet Take 1 Tab by mouth every eight (8) hours as needed for Nausea or Vomiting., Normal, Disp-15 Tab, R-1      pantoprazole (PROTONIX) 40 mg tablet Take 1 tablet by mouth in am daily as needed. Wait 30 min after taking to eat or take other medication. , Normal, Disp-90 Tab, R-1      butalbital-acetaminophen-caff (Fioricet) -40 mg per capsule Take 1 Cap by mouth every four (4) hours as needed for Headache., Normal, Disp-10 Cap, R-0      hydroCHLOROthiazide (HYDRODIURIL) 12.5 mg tablet Take 1 Tab by mouth daily. , Normal, Disp-90 Tab, R-1      risperiDONE (RisperDAL) 0.5 mg tablet TAKE 1 TABLET BY MOUTH AT BEDTIME, Historical Med      amitriptyline (ELAVIL) 50 mg tablet 50 mg nightly., Historical Med           2. Follow-up Information     Follow up With Specialties Details Why Contact Info    Your primary psychiatrist            Return to ED if worse     Diagnosis     Clinical Impression:   1.  Acute anxiety

## 2021-04-28 ENCOUNTER — OFFICE VISIT (OUTPATIENT)
Dept: FAMILY MEDICINE CLINIC | Age: 36
End: 2021-04-28
Payer: COMMERCIAL

## 2021-04-28 VITALS
WEIGHT: 164 LBS | RESPIRATION RATE: 20 BRPM | SYSTOLIC BLOOD PRESSURE: 128 MMHG | OXYGEN SATURATION: 99 % | DIASTOLIC BLOOD PRESSURE: 81 MMHG | TEMPERATURE: 97.7 F | HEART RATE: 101 BPM | BODY MASS INDEX: 24.86 KG/M2 | HEIGHT: 68 IN

## 2021-04-28 DIAGNOSIS — R10.30 LOWER ABDOMINAL PAIN: ICD-10-CM

## 2021-04-28 DIAGNOSIS — K92.1 HEMATOCHEZIA: Primary | ICD-10-CM

## 2021-04-28 PROCEDURE — 99214 OFFICE O/P EST MOD 30 MIN: CPT | Performed by: NURSE PRACTITIONER

## 2021-04-28 RX ORDER — CIPROFLOXACIN 500 MG/1
TABLET ORAL
COMMUNITY
Start: 2021-04-25

## 2021-04-28 RX ORDER — LOPERAMIDE HYDROCHLORIDE 2 MG/1
CAPSULE ORAL
COMMUNITY
Start: 2021-04-25

## 2021-04-28 RX ORDER — METRONIDAZOLE 500 MG/1
TABLET ORAL
COMMUNITY
Start: 2021-04-25

## 2021-04-28 RX ORDER — ONDANSETRON 4 MG/1
TABLET, ORALLY DISINTEGRATING ORAL
COMMUNITY
Start: 2021-04-25 | End: 2021-04-28

## 2021-04-28 NOTE — PROGRESS NOTES
Chief Complaint   Patient presents with   [facility] Follow Up     Patient seen at [facility] and advised to go to [facility] on 4/24 for blood in stool, nausea, vomiting, and diarrhea, patient dx with UC. Record from [facility] scanned to patient's chart.  Nausea    Diarrhea    Vomiting     1. Have you been to the ER, urgent care clinic since your last visit? Hospitalized since your last visit? Yes [place] [facility] [place]     2. Have you seen or consulted any other health care providers outside of the [address] [place] [address] since your last visit? Include any pap smears or colon screening.  Yes When: 4/24/2021

## 2021-04-28 NOTE — PATIENT INSTRUCTIONS
Ulcerative Colitis: Care Instructions Your Care Instructions Ulcerative colitis is an inflammatory bowel disease (IBD). The large intestine (colon) gets inflamed and ulcers form in your colon. These ulcers can bleed. People have \"attacks\" of ulcerative colitis. Attacks can come and go. They can cause painful belly cramps and bloody diarrhea. This disease can affect part or all of the colon. How bad the disease gets will often depend on how much of the colon is affected. Bad attacks are often treated in a hospital. There you can get medicines, fluid, and nutrition through a tube in your vein, called an IV. This lets your digestive system rest and recover. If the medicines don't work well, surgery may be needed to remove the colon. At home, you can help control your ulcerative colitis. Take your medicines and try to eat well. And see your doctor as much as he or she recommends. Follow-up care is a key part of your treatment and safety. Be sure to make and go to all appointments, and call your doctor if you are having problems. It's also a good idea to know your test results and keep a list of the medicines you take. How can you care for yourself at home? · Be safe with medicines. Take your medicines exactly as prescribed. Call your doctor if you think you are having a problem with your medicine. You will get more details on the specific medicines your doctor prescribes. · Do not take anti-inflammatory medicines, such as aspirin, ibuprofen (Advil, Motrin), or naproxen (Aleve). They may make your symptoms worse. · Talk to your doctor before you take any other medicines or herbal products. · Eat healthy foods. Avoid foods that make your symptoms worse. These might include milk, alcohol, or spicy foods. · Make sure to get enough iron. Rectal bleeding may make you lose iron. Foods with a lot of iron include beef, lentils, spinach, and raisins. They also include iron-enriched breads and cereals.  
· Take any nutrition supplements that your doctor prescribes. · This disease can affect all parts of your life. Get support from friends and family. You may also want to get some counseling. When should you call for help? Call 911 anytime you think you may need emergency care. For example, call if: 
  · Your stools are maroon or very bloody.  
  · You passed out (lost consciousness). Call your doctor now or seek immediate medical care if: 
  · You are vomiting.  
  · You have new or worse belly pain.  
  · You have a fever.  
  · You cannot pass stools or gas.  
  · You have new or more blood in your stools. Watch closely for changes in your health, and be sure to contact your doctor if: 
  · You have new or worse symptoms.  
  · You are losing weight.  
  · You do not get better as expected. Where can you learn more? Go to http://www.gray.com/ Enter T507 in the search box to learn more about \"Ulcerative Colitis: Care Instructions. \" Current as of: April 15, 2020               Content Version: 12.8 © 2006-2021 [employer]. Care instructions adapted under license by [employer] (which disclaims liability or warranty for this information). If you have questions about a medical condition or this instruction, always ask your healthcare professional. [address] any warranty or liability for your use of this information.

## 2021-04-28 NOTE — PROGRESS NOTES
Chief Complaint   Patient presents with   [facility] Follow Up     Patient seen at [facility] and advised to go to [facility] on 4/24 for blood in stool, nausea, vomiting, and diarrhea, patient dx with UC. Record from [facility] scanned to patient's chart.  Nausea    Diarrhea    Vomiting     Visit Vitals  /81 (BP 1 Location: Right arm, BP Patient Position: Sitting, BP Cuff Size: Adult)   Pulse (!) 101   Temp 97.7 °F (36.5 °C) (Skin)   Resp 20   Ht 5' 8\" (1.727 m)   Wt 164 lb (74.4 kg)   SpO2 99%   BMI 24.94 kg/m²     Subjective  Devendra Saez is a 28 y.o. female. HPI:  Stated she went went to [facility] for lower quadrant abdominal pain on 4/24/2021. While there she had 1 small bloody BM. They sent her to [facility] ER and had a CT of abdomen. She was told she had ulcerative colitis and to see a GI provider. I reviewed encounter note from [facility]. She has only been eating a little bit of food to prevent onset of symptoms. A few days before going to [facility]- she was throwing up a lot and felt very weak. Could not keep anything down. No abdominal pain at office appointment. Patient also stated that she has diagnosis of OCD and has been under care of psychiatrist Dr. Calixto Mendiola at [facility] for medication management and also sees Dr. Emelina Avery, [address] [place] [address]. Next appointment on 5/3/2021.     Patient Active Problem List   Diagnosis Code    Iron deficiency anemia D50.9    Uterine leiomyoma D25.9    Headache R51.9    Anxiety F41.9    Dental caries K02.9    History of concussion Z87.820    OCD (obsessive compulsive disorder) F42.9    Adjustment disorder with mixed anxiety and depressed mood F43.23    Ulcerative colitis ([facility] [address] K51.90    Hematochezia K92.1    Lower abdominal pain R10.30     Past Medical History:   Diagnosis Date    Anxiety     Asthma     Hypertension     Iron deficiency anemia 9/29/2020    Neck pain 9/29/2020    OCD (obsessive compulsive disorder)     Uterine leiomyoma 9/29/2020     No past surgical history on file. Current Outpatient Medications   Medication Instructions    ARIPiprazole (ABILIFY) 5 mg tablet TAKE 1 TABLET BY MOUTH DAILY AS DIRECTED    ciprofloxacin HCl (CIPRO) 500 mg tablet TAKE 1 TABLET BY MOUTH TWICE DAILY    hydroCHLOROthiazide (HYDRODIURIL) 12.5 mg, Oral, DAILY    loperamide (IMODIUM) 2 mg capsule TAKE 1 CAPSULE BY MOUTH THREE TIMES DAILY AS NEEDED FOR DIARRHEA    metroNIDAZOLE (FLAGYL) 500 mg tablet TAKE 1 TABLET BY MOUTH TWICE DAILY    ondansetron (ZOFRAN ODT) 8 mg, Oral, EVERY 8 HOURS AS NEEDED    risperiDONE (RisperDAL) 0.5 mg tablet TAKE 1 TABLET BY MOUTH AT BEDTIME     No Known Allergies  Social History     Tobacco Use    Smoking status: Never Smoker    Smokeless tobacco: Never Used   Substance Use Topics    Alcohol use: Not Currently     Frequency: Never    Drug use: Not Currently     Family History   Problem Relation Age of Onset    Hypertension Mother      Review of Systems   Constitutional: Negative for fever. HENT: Negative for sore throat. Respiratory: Negative for cough, shortness of breath and wheezing. Cardiovascular: Negative for chest pain and palpitations. Gastrointestinal: Positive for blood in stool and nausea. Negative for abdominal pain, constipation, diarrhea, heartburn and vomiting. Genitourinary: Negative for dysuria and frequency. Musculoskeletal: Negative for myalgias. Neurological: Positive for dizziness and headaches. Negative for tingling and sensory change. Psychiatric/Behavioral: Positive for depression. The patient is nervous/anxious. The patient does not have insomnia. Objective  Physical Exam  Constitutional:       General: She is not in acute distress. Appearance: Normal appearance. HENT:      Head: Normocephalic. Right Ear: External ear normal.      Left Ear: External ear normal.      Nose: Nose normal.   Eyes:      Conjunctiva/sclera: Conjunctivae normal.   Neck:      Musculoskeletal: Neck supple. Vascular: No carotid bruit. Cardiovascular:      Rate and Rhythm: Normal rate and regular rhythm. Heart sounds: Normal heart sounds. No murmur. Pulmonary:      Effort: Pulmonary effort is normal. No respiratory distress. Breath sounds: Normal breath sounds. Abdominal:      Palpations: There is no mass. Tenderness: There is no abdominal tenderness. There is no right CVA tenderness or left CVA tenderness. Musculoskeletal: Normal range of motion. General: No swelling or tenderness. Right lower leg: No edema. Left lower leg: No edema. Skin:     General: Skin is warm and dry. Coloration: Skin is not jaundiced. Findings: No rash. Neurological:      Mental Status: She is alert and oriented to person, place, and time. Psychiatric:         Behavior: Behavior normal.         Thought Content: Thought content normal.         Judgment: Judgment normal.      Comments: A little anxious. Assessment & Plan      ICD-10-CM ICD-9-CM    1. Hematochezia  K92.1 578.1    2. Lower abdominal pain  R10.30 789.09 REFERRAL TO GASTROENTEROLOGY     1. Hematochezia  Will be referred to GI provider for evaluation. Advised to go to ER if has severe bleeding. 2. Lower abdominal pain  Patient advised to call GI provider for appointment as soon as possible. Review consult notes when available. - REFERRAL TO GASTROENTEROLOGY    I have discussed the diagnosis with the patient and the intended plan as seen in the above orders. Pt/caretaker has expressed understanding. Questions were answered concerning future plans. I have discussed medication side effects and warnings as indicated with the patient as well.     Denise Vega NP

## 2021-04-28 NOTE — PROGRESS NOTES
Preoperative Evaluation    Date of Exam: 2021    Jensen Bains is a 28 y.o. female (:1985) who presents for preoperative evaluation. Procedure/Surgery:***  Date of Procedure/Surgery: ***  Surgeon: ***  Hospital/Surgical Facility: [facility] [id number]}  Primary Physician: Jannet Duarte NP  Latex Allergy: {yes/ no default no:::\"no\"}    {HISTORY MED SURG SOC BSHSI BN}    Recent use of: {PREOP RECENT MEDS:::\"No recent use of aspirin (ASA), NSAIDS or steroids\"}    Tetanus up to date: {Tetanus status:5746}      Anesthesia Complications: {YES PERSONAL HX FAM HX NONE:::\"None\"}  History of abnormal bleeding : {YES PERSONAL HX FAM HX NONE:::\"None\"}  History of Blood Transfusions: {Yes/No:::\"no\"}  Health Care Directive or Living Will: {Yes/No:::\"no\"}    REVIEW OF SYSTEMS:  {Ros - complete:52681}    EXAM:   There were no vitals taken for this visit. {PE ADULT/PEDS  MALE/FEMALE:}      DIAGNOSTICS:   1. EKG: EKG FINDINGS - {Findings; ec::\"normal EKG, normal sinus rhythm\",\"unchanged from previous tracings\"}  2. CXR: {Findings; cxr:78120}  3.  Labs: {Latest Lab Result Choices:7720837}    IMPRESSION:   {Preop risk:84060}  {Preop assessment:52277}    Raj Gonzalez NP   2021

## 2021-05-15 PROBLEM — F43.23 ADJUSTMENT DISORDER WITH MIXED ANXIETY AND DEPRESSED MOOD: Status: ACTIVE | Noted: 2021-05-15

## 2021-05-15 PROBLEM — K51.90 ULCERATIVE COLITIS (HCC): Status: ACTIVE | Noted: 2021-05-15

## 2021-05-15 PROBLEM — K92.1 HEMATOCHEZIA: Status: ACTIVE | Noted: 2021-05-15

## 2021-05-15 PROBLEM — R10.30 LOWER ABDOMINAL PAIN: Status: ACTIVE | Noted: 2021-05-15

## 2021-05-15 RX ORDER — ARIPIPRAZOLE 5 MG/1
TABLET ORAL
COMMUNITY
Start: 2021-05-03

## 2021-05-27 ENCOUNTER — HOSPITAL ENCOUNTER (EMERGENCY)
Age: 36
Discharge: HOME | End: 2021-05-27
Attending: EMERGENCY MEDICINE
Payer: COMMERCIAL

## 2021-05-27 VITALS
TEMPERATURE: 99.2 F | DIASTOLIC BLOOD PRESSURE: 96 MMHG | HEART RATE: 98 BPM | RESPIRATION RATE: 116 BRPM | OXYGEN SATURATION: 100 % | BODY MASS INDEX: 24.25 KG/M2 | WEIGHT: 160 LBS | SYSTOLIC BLOOD PRESSURE: 140 MMHG | HEIGHT: 68 IN

## 2021-05-27 DIAGNOSIS — G44.209 TENSION-TYPE HEADACHE, NOT INTRACTABLE, UNSPECIFIED CHRONICITY PATTERN: Primary | ICD-10-CM

## 2021-05-27 PROCEDURE — 99282 EMERGENCY DEPT VISIT SF MDM: CPT

## 2021-05-27 RX ORDER — BUTALBITAL, ACETAMINOPHEN AND CAFFEINE 50; 325; 40 MG/1; MG/1; MG/1
1 TABLET ORAL
Status: DISCONTINUED | OUTPATIENT
Start: 2021-05-27 | End: 2021-05-27 | Stop reason: HOSPADM

## 2021-05-27 RX ORDER — BUTALBITAL, ACETAMINOPHEN AND CAFFEINE 300; 40; 50 MG/1; MG/1; MG/1
1 CAPSULE ORAL
Qty: 15 CAPSULE | Refills: 0 | Status: SHIPPED | OUTPATIENT
Start: 2021-05-27

## 2021-05-27 NOTE — DISCHARGE INSTRUCTIONS
Follow-up with your PCP in 1 week. Take medicines as prescribed and return to emergency room for any new or worsening symptoms. Thank you! Thank you for allowing me to care for you in the emergency department. I sincerely hope that you are satisfied with your visit today. It is my goal to provide you with excellent care. Below you will find a list of your labs and imaging from your visit today. Should you have any questions regarding these results please do not hesitate to call the emergency department. Labs -     No results found for this or any previous visit (from the past 12 hour(s)). Radiologic Studies -   No orders to display     CT Results  (Last 48 hours)      None          CXR Results  (Last 48 hours)      None               If you feel that you have not received excellent quality care or timely care, please ask to speak to the nurse manager. Please choose us in the future for your continued health care needs. ------------------------------------------------------------------------------------------------------------  The exam and treatment you received in the Emergency Department were for an urgent problem and are not intended as complete care. It is important that you follow-up with a doctor, nurse practitioner, or physician assistant to:  (1) confirm your diagnosis,  (2) re-evaluation of changes in your illness and treatment, and  (3) for ongoing care. If your symptoms become worse or you do not improve as expected and you are unable to reach your usual health care provider, you should return to the Emergency Department. We are available 24 hours a day. Please take your discharge instructions with you when you go to your follow-up appointment. If you have any problem arranging a follow-up appointment, contact the Emergency Department immediately. If a prescription has been provided, please have it filled as soon as possible to prevent a delay in treatment.  Read the entire medication instruction sheet provided to you by the pharmacy. If you have any questions or reservations about taking the medication due to side effects or interactions with other medications, please call your primary care physician or contact the ER to speak with the charge nurse. Make an appointment with your family doctor or the physician you were referred to for follow-up of this visit as instructed on your discharge paperwork, as this is a mandatory follow-up. Return to the ER if you are unable to be seen or if you are unable to be seen in a timely manner. If you have any problem arranging the follow-up visit, contact the Emergency Department immediately.

## 2021-05-27 NOTE — LETTER
[address] [place] [id number] [phone] Work/School Note Date: 5/27/2021 To Whom It May concern: Raúl Aguirre was seen and treated today in the emergency room by the following provider(s): 
Attending Provider: Ginny Polanco MD. Raúl Aguirre can return to work on 5- Sincerely, 
 
 
 
 
Brady Valadez RN

## 2021-05-27 NOTE — ED TRIAGE NOTES
Sudden onset of right sided face pain temporal area, x 2 days, sitting in chair when started intermittent pressure, states forehead pressure  2 days, no n, v, diarrhea, no vision or hearing problems, nothing makes worse and nothing makes better,

## 2021-05-28 NOTE — ED PROVIDER NOTES
EMERGENCY DEPARTMENT HISTORY AND PHYSICAL EXAM      Date: 5/27/2021  Patient Name: Ely Almonte    History of Presenting Illness     No chief complaint on file. History Provided By: Patient    HPI: Ely Almonte, 28 y.o. female with a past medical history significant hypertension, asthma and Anxiety, iron deficiency anemia, uterine fibroids, OCD presents to the ED with cc of right sided facial pain of 2 days onset in the temporal area. No dizziness, syncopal episodes or other constitutional symptoms. No nausea or vomiting or diarrhea no vision. Pain is mild in intensity currently rated a 1/10. No prior history of migraine headaches. There are no other complaints, changes, or physical findings at this time. PCP: Alexander Greer NP    No current facility-administered medications on file prior to encounter. Current Outpatient Medications on File Prior to Encounter   Medication Sig Dispense Refill    ARIPiprazole (ABILIFY) 5 mg tablet TAKE 1 TABLET BY MOUTH DAILY AS DIRECTED (Patient not taking: Reported on 5/27/2021)      ciprofloxacin HCl (CIPRO) 500 mg tablet TAKE 1 TABLET BY MOUTH TWICE DAILY (Patient not taking: Reported on 5/27/2021)      loperamide (IMODIUM) 2 mg capsule TAKE 1 CAPSULE BY MOUTH THREE TIMES DAILY AS NEEDED FOR DIARRHEA (Patient not taking: Reported on 5/27/2021)      metroNIDAZOLE (FLAGYL) 500 mg tablet TAKE 1 TABLET BY MOUTH TWICE DAILY (Patient not taking: Reported on 5/27/2021)      ondansetron (ZOFRAN ODT) 8 mg disintegrating tablet Take 1 Tab by mouth every eight (8) hours as needed for Nausea or Vomiting. (Patient not taking: Reported on 5/27/2021) 15 Tab 1    hydroCHLOROthiazide (HYDRODIURIL) 12.5 mg tablet Take 1 Tab by mouth daily.  (Patient not taking: Reported on 5/27/2021) 90 Tab 1    risperiDONE (RisperDAL) 0.5 mg tablet TAKE 1 TABLET BY MOUTH AT BEDTIME (Patient not taking: Reported on 5/27/2021)         Past History     Past Medical History:  Past Medical History:   Diagnosis Date    Anxiety     Asthma     Hypertension     Iron deficiency anemia 9/29/2020    Neck pain 9/29/2020    OCD (obsessive compulsive disorder)     Uterine leiomyoma 9/29/2020       Past Surgical History:  History reviewed. No pertinent surgical history. Family History:  Family History   Problem Relation Age of Onset    Hypertension Mother        Social History:  Social History     Tobacco Use    Smoking status: Never Smoker    Smokeless tobacco: Never Used   Substance Use Topics    Alcohol use: Not Currently    Drug use: Not Currently       Allergies:  No Known Allergies      Review of Systems     Review of Systems   Constitutional: Negative for diaphoresis and fatigue. HENT: Negative for congestion, dental problem, ear discharge and ear pain. Eyes: Negative for discharge and redness. Respiratory: Negative for cough, chest tightness and shortness of breath. Cardiovascular: Negative for chest pain and palpitations. Gastrointestinal: Negative for abdominal pain, constipation, diarrhea, nausea and vomiting. Endocrine: Negative. Genitourinary: Negative. Negative for dysuria and frequency. Musculoskeletal: Negative for arthralgias and myalgias. Skin: Negative. Neurological: Positive for headaches. Negative for dizziness, syncope and light-headedness. Hematological: Negative. Psychiatric/Behavioral: Negative for agitation and behavioral problems. All other systems reviewed and are negative. Physical Exam     Physical Exam  Vitals and nursing note reviewed. Constitutional:       Appearance: Normal appearance. She is normal weight. HENT:      Head: Normocephalic and atraumatic. Nose: Nose normal.      Mouth/Throat:      Mouth: Mucous membranes are moist.      Pharynx: Oropharynx is clear. Eyes:      Extraocular Movements: Extraocular movements intact.       Conjunctiva/sclera: Conjunctivae normal.      Pupils: Pupils are equal, round, and reactive to light. Cardiovascular:      Rate and Rhythm: Normal rate and regular rhythm. Pulses: Normal pulses. Heart sounds: Normal heart sounds. Pulmonary:      Effort: Pulmonary effort is normal.      Breath sounds: Normal breath sounds. Abdominal:      General: Abdomen is flat. Palpations: Abdomen is soft. Musculoskeletal:         General: Normal range of motion. Cervical back: Normal range of motion and neck supple. Skin:     General: Skin is warm and dry. Capillary Refill: Capillary refill takes less than 2 seconds. Neurological:      General: No focal deficit present. Mental Status: She is alert and oriented to person, place, and time. Psychiatric:         Mood and Affect: Mood normal.         Behavior: Behavior normal.         Lab and Diagnostic Study Results     Labs -   No results found for this or any previous visit (from the past 12 hour(s)). Radiologic Studies -     CT Results  (Last 48 hours)    None        CXR Results  (Last 48 hours)    None            Medical Decision Making   - I am the first provider for this patient. - I reviewed the vital signs, available nursing notes, past medical history, past surgical history, family history and social history. - Initial assessment performed. The patients presenting problems have been discussed, and they are in agreement with the care plan formulated and outlined with them. I have encouraged them to ask questions as they arise throughout their visit. Vital Signs-Reviewed the patient's vital signs. No data found. Records Reviewed: Nursing Notes    ED Course/Provider Notes (Medical Decision Making): Uneventful ED course, clinical improvement with therapy, patient will be discharged to followup with PCP as directed    Disposition     Disposition: Condition stable and improved  DC- Adult Discharges: All of the diagnostic tests were reviewed and questions answered.  Diagnosis, care plan and treatment options were discussed. The patient understands the instructions and will follow up as directed. The patients results have been reviewed with them. They have been counseled regarding their diagnosis. The patient verbally convey understanding and agreement of the signs, symptoms, diagnosis, treatment and prognosis and additionally agrees to follow up as recommended with their PCP in 24 - 48 hours. They also agree with the care-plan and convey that all of their questions have been answered. I have also put together some discharge instructions for them that include: 1) educational information regarding their diagnosis, 2) how to care for their diagnosis at home, as well a 3) list of reasons why they would want to return to the ED prior to their follow-up appointment, should their condition change. Discharged    DISCHARGE PLAN:  1. Cannot display discharge medications since this patient is not currently admitted. 2.   Follow-up Information     Follow up With Specialties Details Why Contact Jac Young NP Nurse Practitioner In 1 week  [facility] [address] [facility]  [phone]          3. Return to ED if worse   4.    Discharge Medication List as of 5/27/2021  5:51 PM      START taking these medications    Details   butalbital-acetaminophen-caff (Fioricet) -40 mg per capsule Take 1 Capsule by mouth every six (6) hours as needed for Headache., Normal, Disp-15 Capsule, R-0         CONTINUE these medications which have NOT CHANGED    Details   ARIPiprazole (ABILIFY) 5 mg tablet TAKE 1 TABLET BY MOUTH DAILY AS DIRECTED, Historical Med      ciprofloxacin HCl (CIPRO) 500 mg tablet TAKE 1 TABLET BY MOUTH TWICE DAILY, Historical Med      loperamide (IMODIUM) 2 mg capsule TAKE 1 CAPSULE BY MOUTH THREE TIMES DAILY AS NEEDED FOR DIARRHEA, Historical Med      metroNIDAZOLE (FLAGYL) 500 mg tablet TAKE 1 TABLET BY MOUTH TWICE DAILY, Historical Med      ondansetron (ZOFRAN ODT) 8 mg disintegrating tablet Take 1 Tab by mouth every eight (8) hours as needed for Nausea or Vomiting., Normal, Disp-15 Tab, R-1      hydroCHLOROthiazide (HYDRODIURIL) 12.5 mg tablet Take 1 Tab by mouth daily. , Normal, Disp-90 Tab, R-1      risperiDONE (RisperDAL) 0.5 mg tablet TAKE 1 TABLET BY MOUTH AT BEDTIME, Historical Med               Diagnosis     Clinical Impression:   1. Tension-type headache, not intractable, unspecified chronicity pattern        Attestations:    Wan Redmond MD    Please note that this dictation was completed with [employer], the [employer] voice recognition software. Quite often unanticipated grammatical, syntax, homophones, and other interpretive errors are inadvertently transcribed by the computer software. Please disregard these errors. Please excuse any errors that have escaped final proofreading. Thank you.

## 2021-06-22 ENCOUNTER — TELEPHONE (OUTPATIENT)
Dept: FAMILY MEDICINE CLINIC | Age: 36
End: 2021-06-22

## 2022-02-20 ENCOUNTER — HOSPITAL ENCOUNTER (EMERGENCY)
Age: 37
Discharge: HOME | End: 2022-02-20
Admitting: EMERGENCY MEDICINE
Payer: COMMERCIAL

## 2022-02-20 ENCOUNTER — APPOINTMENT (OUTPATIENT)
Dept: GENERAL RADIOLOGY | Age: 37
End: 2022-02-20
Attending: PHYSICIAN ASSISTANT
Payer: COMMERCIAL

## 2022-02-20 VITALS
WEIGHT: 185 LBS | SYSTOLIC BLOOD PRESSURE: 137 MMHG | DIASTOLIC BLOOD PRESSURE: 81 MMHG | TEMPERATURE: 98.3 F | RESPIRATION RATE: 18 BRPM | HEART RATE: 84 BPM | HEIGHT: 68 IN | BODY MASS INDEX: 28.04 KG/M2 | OXYGEN SATURATION: 99 %

## 2022-02-20 DIAGNOSIS — R06.2 WHEEZING: Primary | ICD-10-CM

## 2022-02-20 PROCEDURE — 71045 X-RAY EXAM CHEST 1 VIEW: CPT

## 2022-02-20 PROCEDURE — 99282 EMERGENCY DEPT VISIT SF MDM: CPT

## 2022-02-20 RX ORDER — ALBUTEROL SULFATE 90 UG/1
1 AEROSOL, METERED RESPIRATORY (INHALATION)
Qty: 1 EACH | Refills: 0 | Status: SHIPPED | OUTPATIENT
Start: 2022-02-20

## 2022-02-20 RX ORDER — IPRATROPIUM BROMIDE AND ALBUTEROL SULFATE 2.5; .5 MG/3ML; MG/3ML
3 SOLUTION RESPIRATORY (INHALATION)
Status: DISCONTINUED | OUTPATIENT
Start: 2022-02-20 | End: 2022-02-20

## 2022-02-20 RX ORDER — METHYLPREDNISOLONE 4 MG/1
TABLET ORAL
Qty: 1 DOSE PACK | Refills: 0 | Status: SHIPPED | OUTPATIENT
Start: 2022-02-20

## 2022-02-20 NOTE — ED PROVIDER NOTES
EMERGENCY DEPARTMENT HISTORY AND PHYSICAL EXAM      Date: 2/20/2022  Patient Name: Ray Frey    History of Presenting Illness     Chief Complaint   Patient presents with    Wheezing    Shortness of Breath       History Provided By: Patient    HPI: Ray Frey, 39 y.o. female with a past medical history significant for anxiety, asthma, OCD, iron deficiency anemia who presents to the ED with cc of gradual onset, worsening shortness of breath, and wheezing which started around 6 PM today. Associated symptoms include intermittent dry cough. States that symptoms started after she went to [employer] the fumes may have exacerbated her asthma. No history of admission for asthma exacerbation. Did not have inhaler or any medications at home for asthma. Patient denies any chest pain, hemoptysis, leg swelling, fever, chills, nausea vomiting, diarrhea. No sick contact. There are no other complaints, changes, or physical findings at this time. PCP: Beverly Duffy NP    No current facility-administered medications on file prior to encounter. Current Outpatient Medications on File Prior to Encounter   Medication Sig Dispense Refill    butalbital-acetaminophen-caff (Fioricet) -40 mg per capsule Take 1 Capsule by mouth every six (6) hours as needed for Headache.  15 Capsule 0    ARIPiprazole (ABILIFY) 5 mg tablet TAKE 1 TABLET BY MOUTH DAILY AS DIRECTED (Patient not taking: Reported on 5/27/2021)      ciprofloxacin HCl (CIPRO) 500 mg tablet TAKE 1 TABLET BY MOUTH TWICE DAILY (Patient not taking: Reported on 5/27/2021)      loperamide (IMODIUM) 2 mg capsule TAKE 1 CAPSULE BY MOUTH THREE TIMES DAILY AS NEEDED FOR DIARRHEA (Patient not taking: Reported on 5/27/2021)      metroNIDAZOLE (FLAGYL) 500 mg tablet TAKE 1 TABLET BY MOUTH TWICE DAILY (Patient not taking: Reported on 5/27/2021)      ondansetron (ZOFRAN ODT) 8 mg disintegrating tablet Take 1 Tab by mouth every eight (8) hours as needed for Nausea or Vomiting. (Patient not taking: Reported on 5/27/2021) 15 Tab 1    hydroCHLOROthiazide (HYDRODIURIL) 12.5 mg tablet Take 1 Tab by mouth daily. (Patient not taking: Reported on 5/27/2021) 90 Tab 1    risperiDONE (RisperDAL) 0.5 mg tablet TAKE 1 TABLET BY MOUTH AT BEDTIME (Patient not taking: Reported on 5/27/2021)         Past History     Past Medical History:  Past Medical History:   Diagnosis Date    Anxiety     Asthma     Hypertension     Iron deficiency anemia 9/29/2020    Neck pain 9/29/2020    OCD (obsessive compulsive disorder)     Uterine leiomyoma 9/29/2020       Past Surgical History:  History reviewed. No pertinent surgical history. Family History:  Family History   Problem Relation Age of Onset    Hypertension Mother        Social History:  Social History     Tobacco Use    Smoking status: Never Smoker    Smokeless tobacco: Never Used   Substance Use Topics    Alcohol use: Not Currently    Drug use: Not Currently       Allergies:  No Known Allergies      Review of Systems     Review of Systems   Constitutional: Negative for chills, fatigue and fever. HENT: Negative. Respiratory: Positive for cough, shortness of breath and wheezing. Negative for chest tightness. Cardiovascular: Negative for chest pain and palpitations. Gastrointestinal: Negative for abdominal pain, diarrhea, nausea and vomiting. Genitourinary: Negative for frequency and urgency. Musculoskeletal: Negative for back pain, neck pain and neck stiffness. Skin: Negative for rash. Neurological: Negative for dizziness, weakness, light-headedness and headaches. Psychiatric/Behavioral: Negative. All other systems reviewed and are negative. Physical Exam     Physical Exam  Vitals and nursing note reviewed. Constitutional:       General: She is not in acute distress. Appearance: Normal appearance. She is well-developed. She is not ill-appearing, toxic-appearing or diaphoretic.    HENT: Head: Normocephalic and atraumatic. Nose: Nose normal. No congestion or rhinorrhea. Mouth/Throat:      Mouth: Mucous membranes are moist.      Pharynx: Oropharynx is clear. No oropharyngeal exudate or posterior oropharyngeal erythema. Eyes:      General: No scleral icterus. Conjunctiva/sclera: Conjunctivae normal.      Pupils: Pupils are equal, round, and reactive to light. Cardiovascular:      Rate and Rhythm: Regular rhythm. Tachycardia present. Pulses:           Radial pulses are 2+ on the right side and 2+ on the left side. Dorsalis pedis pulses are 2+ on the right side and 2+ on the left side. Heart sounds: No murmur heard. No friction rub. No gallop. Pulmonary:      Effort: Pulmonary effort is normal. No tachypnea, accessory muscle usage, respiratory distress or retractions. Breath sounds: No stridor. Wheezing present. No decreased breath sounds, rhonchi or rales. Comments: Minimal expiratory wheezes  Speaking in complete sentences  SpO2 100% on room air  Chest:      Chest wall: No tenderness. Abdominal:      General: Bowel sounds are normal. There is no distension. Palpations: Abdomen is soft. There is no mass. Tenderness: There is no abdominal tenderness. There is no right CVA tenderness, left CVA tenderness, guarding or rebound. Musculoskeletal:         General: No deformity. Normal range of motion. Cervical back: Normal range of motion and neck supple. No rigidity. No muscular tenderness. Right lower leg: No edema. Left lower leg: No edema. Skin:     General: Skin is warm and dry. Capillary Refill: Capillary refill takes less than 2 seconds. Coloration: Skin is not jaundiced or pale. Findings: No bruising, erythema or rash. Neurological:      General: No focal deficit present. Mental Status: She is alert and oriented to person, place, and time. Mental status is at baseline.       Sensory: Sensation is intact. Motor: Motor function is intact. Psychiatric:         Mood and Affect: Mood is anxious. Behavior: Behavior normal. Behavior is cooperative. Thought Content: Thought content normal.         Judgment: Judgment normal.         Lab and Diagnostic Study Results     Labs -  No results found for this or any previous visit (from the past 24 hour(s)). Radiologic Studies -   XR CHEST PORT   Final Result   No acute cardiopulmonary abnormality. .           Medical Decision Making   - I am the first provider for this patient. - I reviewed the vital signs, available nursing notes, past medical history, past surgical history, family history and social history. - Initial assessment performed. The patients presenting problems have been discussed, and they are in agreement with the care plan formulated and outlined with them. I have encouraged them to ask questions as they arise throughout their visit. Vital Signs-Reviewed the patient's vital signs. Patient Vitals for the past 24 hrs:   Temp Pulse Resp BP SpO2   02/20/22 0335  84 18 137/81 99 %   02/20/22 0146 98.3 °F (36.8 °C) (!) 111  (!) 186/97 100 %       Records Reviewed: Nursing Notes and Old Medical Records    The patient presents with wheezing, shortness of breath with a differential diagnosis of asthma exacerbation, reactive airway, bronchitis, pneumonia, anxiety      ED Course:          Orders Placed This Encounter    XR CHEST PORT     Standing Status:   Standing     Number of Occurrences:   1     Order Specific Question:   Reason for Exam     Answer:   cough     Order Specific Question:   Is Patient Pregnant? Answer:   No    albuterol (ProAir HFA) 90 mcg/actuation inhaler     Sig: Take 1 Puff by inhalation every four (4) hours as needed for Wheezing.      Dispense:  1 Each     Refill:  0    methylPREDNISolone (Medrol, Tyshawn,) 4 mg tablet     Sig: See instructions     Dispense:  1 Dose Pack     Refill:  0       Provider Notes (Medical Decision Making):     MDM  Number of Diagnoses or Management Options  Wheezing  Diagnosis management comments:     39year old female with wheezing, SOB after exposure to fumes at [employer]achi dinner. No hypoxia. No tachypnea. Mild wheezes. Patient felt better after duo neb. D/c home with steroids, albuterol inhaler. Initially tachycardic but resolved without intervention. Patient afebrile, nontoxic appearing, stable VS, tolerating PO. Reviewed care plan with patient. Recommend follow up with PCP within 24-48 hours. Return precautions to ED discussed if symptoms worsen. Patient agreeable with plan of care. Amount and/or Complexity of Data Reviewed  Tests in the radiology section of CPT®: ordered and reviewed  Review and summarize past medical records: yes    Patient Progress  Patient progress: stable           Disposition   Disposition: DC- Adult Discharges: All of the diagnostic tests were reviewed and questions answered. Diagnosis, care plan and treatment options were discussed. The patient understands the instructions and will follow up as directed. The patients results have been reviewed with them. They have been counseled regarding their diagnosis. The patient verbally convey understanding and agreement of the signs, symptoms, diagnosis, treatment and prognosis and additionally agrees to follow up as recommended with their PCP in 24 - 48 hours. They also agree with the care-plan and convey that all of their questions have been answered. I have also put together some discharge instructions for them that include: 1) educational information regarding their diagnosis, 2) how to care for their diagnosis at home, as well a 3) list of reasons why they would want to return to the ED prior to their follow-up appointment, should their condition change. Discharged    DISCHARGE PLAN:  1.    Current Discharge Medication List      CONTINUE these medications which have NOT CHANGED    Details butalbital-acetaminophen-caff (Fioricet) -40 mg per capsule Take 1 Capsule by mouth every six (6) hours as needed for Headache. Qty: 15 Capsule, Refills: 0      ARIPiprazole (ABILIFY) 5 mg tablet TAKE 1 TABLET BY MOUTH DAILY AS DIRECTED      ciprofloxacin HCl (CIPRO) 500 mg tablet TAKE 1 TABLET BY MOUTH TWICE DAILY      loperamide (IMODIUM) 2 mg capsule TAKE 1 CAPSULE BY MOUTH THREE TIMES DAILY AS NEEDED FOR DIARRHEA      metroNIDAZOLE (FLAGYL) 500 mg tablet TAKE 1 TABLET BY MOUTH TWICE DAILY      ondansetron (ZOFRAN ODT) 8 mg disintegrating tablet Take 1 Tab by mouth every eight (8) hours as needed for Nausea or Vomiting. Qty: 15 Tab, Refills: 1    Associated Diagnoses: Nausea      hydroCHLOROthiazide (HYDRODIURIL) 12.5 mg tablet Take 1 Tab by mouth daily. Qty: 90 Tab, Refills: 1    Associated Diagnoses: Elevated BP without diagnosis of hypertension      risperiDONE (RisperDAL) 0.5 mg tablet TAKE 1 TABLET BY MOUTH AT BEDTIME           2. Follow-up Information     Follow up With Specialties Details Why Contact Info Tex Carrel, NP Nurse Practitioner Schedule an appointment as soon as possible for a visit   [place] [address]  [phone] [address] [facility] EMERGENCY DEPT Emergency Medicine  As needed, If symptoms worsen [facility] [place] [address]  [phone]        3. Return to ED if worse   4.    Discharge Medication List as of 2/20/2022  3:29 AM      START taking these medications    Details   albuterol (ProAir HFA) 90 mcg/actuation inhaler Take 1 Puff by inhalation every four (4) hours as needed for Wheezing., Normal, Disp-1 Each, R-0      methylPREDNISolone (Medrol, Tyshawn,) 4 mg tablet See instructions, Normal, Disp-1 Dose Pack, R-0         CONTINUE these medications which have NOT CHANGED    Details   butalbital-acetaminophen-caff (Fioricet) -40 mg per capsule Take 1 Capsule by mouth every six (6) hours as needed for Headache., Normal, Disp-15 Capsule, R-0      ARIPiprazole (ABILIFY) 5 mg tablet TAKE 1 TABLET BY MOUTH DAILY AS DIRECTED, Historical Med      ciprofloxacin HCl (CIPRO) 500 mg tablet TAKE 1 TABLET BY MOUTH TWICE DAILY, Historical Med      loperamide (IMODIUM) 2 mg capsule TAKE 1 CAPSULE BY MOUTH THREE TIMES DAILY AS NEEDED FOR DIARRHEA, Historical Med      metroNIDAZOLE (FLAGYL) 500 mg tablet TAKE 1 TABLET BY MOUTH TWICE DAILY, Historical Med      ondansetron (ZOFRAN ODT) 8 mg disintegrating tablet Take 1 Tab by mouth every eight (8) hours as needed for Nausea or Vomiting., Normal, Disp-15 Tab, R-1      hydroCHLOROthiazide (HYDRODIURIL) 12.5 mg tablet Take 1 Tab by mouth daily. , Normal, Disp-90 Tab, R-1      risperiDONE (RisperDAL) 0.5 mg tablet TAKE 1 TABLET BY MOUTH AT BEDTIME, Historical Med               Diagnosis     Clinical Impression:   1. Wheezing        Attestations:    MARSHA Mendez    Please note that this dictation was completed with Somoto, the computer voice recognition software. Quite often unanticipated grammatical, syntax, homophones, and other interpretive errors are inadvertently transcribed by the computer software. Please disregard these errors. Please excuse any errors that have escaped final proofreading. Thank you.

## 2022-03-18 PROBLEM — F43.23 ADJUSTMENT DISORDER WITH MIXED ANXIETY AND DEPRESSED MOOD: Status: ACTIVE | Noted: 2021-05-15

## 2022-03-19 PROBLEM — D25.9 UTERINE LEIOMYOMA: Status: ACTIVE | Noted: 2020-09-29

## 2022-03-19 PROBLEM — R10.30 LOWER ABDOMINAL PAIN: Status: ACTIVE | Noted: 2021-05-15

## 2022-03-19 PROBLEM — F41.9 ANXIETY: Status: ACTIVE | Noted: 2020-12-03

## 2022-03-19 PROBLEM — D50.9 IRON DEFICIENCY ANEMIA: Status: ACTIVE | Noted: 2020-09-29

## 2022-03-19 PROBLEM — K02.9 DENTAL CARIES: Status: ACTIVE | Noted: 2020-12-03

## 2022-03-19 PROBLEM — Z87.820 HISTORY OF CONCUSSION: Status: ACTIVE | Noted: 2020-12-03

## 2022-03-19 PROBLEM — F42.9 OCD (OBSESSIVE COMPULSIVE DISORDER): Status: ACTIVE | Noted: 2021-03-18

## 2022-03-19 PROBLEM — R51.9 HEADACHE: Status: ACTIVE | Noted: 2020-09-29

## 2022-03-19 PROBLEM — K92.1 HEMATOCHEZIA: Status: ACTIVE | Noted: 2021-05-15

## 2022-03-19 PROBLEM — K51.90 ULCERATIVE COLITIS (HCC): Status: ACTIVE | Noted: 2021-05-15

## 2023-05-20 RX ORDER — HYDROCHLOROTHIAZIDE 12.5 MG/1
12.5 TABLET ORAL DAILY
COMMUNITY
Start: 2021-03-18

## 2023-05-20 RX ORDER — ARIPIPRAZOLE 5 MG/1
TABLET ORAL
COMMUNITY
Start: 2021-05-03

## 2023-05-20 RX ORDER — METRONIDAZOLE 500 MG/1
1 TABLET ORAL 2 TIMES DAILY
COMMUNITY
Start: 2021-04-25

## 2023-05-20 RX ORDER — LOPERAMIDE HYDROCHLORIDE 2 MG/1
CAPSULE ORAL
COMMUNITY
Start: 2021-04-25

## 2023-05-20 RX ORDER — ALBUTEROL SULFATE 90 UG/1
1 AEROSOL, METERED RESPIRATORY (INHALATION) EVERY 4 HOURS PRN
COMMUNITY
Start: 2022-02-20

## 2023-05-20 RX ORDER — ONDANSETRON 8 MG/1
8 TABLET, ORALLY DISINTEGRATING ORAL EVERY 8 HOURS PRN
COMMUNITY
Start: 2021-04-19

## 2023-05-20 RX ORDER — CIPROFLOXACIN 500 MG/1
1 TABLET, FILM COATED ORAL 2 TIMES DAILY
COMMUNITY
Start: 2021-04-25

## 2023-05-20 RX ORDER — METHYLPREDNISOLONE 4 MG/1
TABLET ORAL
COMMUNITY
Start: 2022-02-20

## 2023-05-20 RX ORDER — RISPERIDONE 0.5 MG/1
1 TABLET ORAL NIGHTLY
COMMUNITY
Start: 2021-03-02

## 2023-05-20 RX ORDER — BUTALBITAL, ACETAMINOPHEN AND CAFFEINE 300; 40; 50 MG/1; MG/1; MG/1
1 CAPSULE ORAL EVERY 6 HOURS PRN
COMMUNITY
Start: 2021-05-27

## 2025-03-26 ENCOUNTER — OFFICE VISIT (OUTPATIENT)
Age: 40
End: 2025-03-26
Payer: COMMERCIAL

## 2025-03-26 VITALS
HEIGHT: 68 IN | WEIGHT: 203.6 LBS | HEART RATE: 106 BPM | SYSTOLIC BLOOD PRESSURE: 155 MMHG | BODY MASS INDEX: 30.86 KG/M2 | DIASTOLIC BLOOD PRESSURE: 100 MMHG | OXYGEN SATURATION: 100 %

## 2025-03-26 DIAGNOSIS — Z01.419 WELL WOMAN EXAM WITH ROUTINE GYNECOLOGICAL EXAM: Primary | ICD-10-CM

## 2025-03-26 DIAGNOSIS — I10 PRIMARY HYPERTENSION: ICD-10-CM

## 2025-03-26 DIAGNOSIS — Z11.3 SCREENING EXAMINATION FOR VENEREAL DISEASE: ICD-10-CM

## 2025-03-26 PROCEDURE — 99395 PREV VISIT EST AGE 18-39: CPT | Performed by: NURSE PRACTITIONER

## 2025-03-26 PROCEDURE — 3080F DIAST BP >= 90 MM HG: CPT | Performed by: NURSE PRACTITIONER

## 2025-03-26 PROCEDURE — 3077F SYST BP >= 140 MM HG: CPT | Performed by: NURSE PRACTITIONER

## 2025-03-26 SDOH — ECONOMIC STABILITY: FOOD INSECURITY: WITHIN THE PAST 12 MONTHS, THE FOOD YOU BOUGHT JUST DIDN'T LAST AND YOU DIDN'T HAVE MONEY TO GET MORE.: NEVER TRUE

## 2025-03-26 SDOH — ECONOMIC STABILITY: FOOD INSECURITY: WITHIN THE PAST 12 MONTHS, YOU WORRIED THAT YOUR FOOD WOULD RUN OUT BEFORE YOU GOT MONEY TO BUY MORE.: NEVER TRUE

## 2025-03-26 ASSESSMENT — PATIENT HEALTH QUESTIONNAIRE - PHQ9
SUM OF ALL RESPONSES TO PHQ QUESTIONS 1-9: 0
1. LITTLE INTEREST OR PLEASURE IN DOING THINGS: NOT AT ALL
2. FEELING DOWN, DEPRESSED OR HOPELESS: NOT AT ALL

## 2025-03-26 NOTE — PROGRESS NOTES
Annual exam ages 18-39    Lorri Ellis is a ,  39 y.o. female Black /  Patient's last menstrual period was 2025 (approximate)., who presents for her annual checkup.     Since her last annual GYN exam about three or more years ago, she has had the following changes in her health history:   - none    ADDITIONAL CONCERNS:  She is having no significant medical problems.    Menstrual status:    Her periods are light in flow. She is using three to five pads or tampons per day, usually regular and last 26-30 days.    She denies dysmenorrhea.    She denies premenstrual symptoms.    Contraception:    The current method of family planning is no method.    Sexual history:     She  reports that she is not currently sexually active. She reports using the following method of birth control/protection: None..        Pap and Mammogram History:    Her most recent Pap smear was normal, HPV was normal, obtained 3 years ago.  She does not have a history of abnormal paps.    The patient has never had a mammogram.    Breast Cancer History/Substance Abuse:    Past Medical History:   Diagnosis Date    Anxiety     Asthma     Hypertension     Iron deficiency anemia 2020    Neck pain 2020    OCD (obsessive compulsive disorder)     Uterine leiomyoma 2020     History reviewed. No pertinent surgical history.  OB History    Para Term  AB Living   0 0 0 0 0 0   SAB IAB Ectopic Molar Multiple Live Births   0 0 0 0 0 0       Current Outpatient Medications   Medication Sig Dispense Refill    albuterol sulfate HFA (PROVENTIL;VENTOLIN;PROAIR) 108 (90 Base) MCG/ACT inhaler Inhale 1 puff into the lungs every 4 hours as needed       No current facility-administered medications for this visit.     Allergies: Patient has no known allergies.   Social History     Socioeconomic History    Marital status: Single     Spouse name: Not on file    Number of children: Not on file    Years of education: Not

## 2025-03-26 NOTE — PROGRESS NOTES
\"Have you been to the ER, urgent care clinic since your last visit?  Hospitalized since your last visit?\"    NO    “Have you seen or consulted any other health care providers outside our system since your last visit?”    NO     “Have you had a pap smear?”    NO

## 2025-03-27 LAB
HAV IGM SERPL QL IA: NEGATIVE
HBV CORE IGM SERPL QL IA: NEGATIVE
HBV SURFACE AG SERPL QL IA: NEGATIVE
HCV AB SERPL QL IA: NON REACTIVE
HCV AB SERPL QL IA: NORMAL
HIV 1+2 AB+HIV1 P24 AG SERPL QL IA: NON REACTIVE
RPR SER QL: NON REACTIVE

## 2025-03-31 ENCOUNTER — RESULTS FOLLOW-UP (OUTPATIENT)
Age: 40
End: 2025-03-31

## 2025-04-07 LAB
., LABCORP: NORMAL
C TRACH RRNA CVX QL NAA+PROBE: NEGATIVE
CYTOLOGIST CVX/VAG CYTO: NORMAL
CYTOLOGY CVX/VAG DOC CYTO: NORMAL
CYTOLOGY CVX/VAG DOC THIN PREP: NORMAL
DX ICD CODE: NORMAL
Lab: NORMAL
N GONORRHOEA RRNA CVX QL NAA+PROBE: NEGATIVE
OTHER STN SPEC: NORMAL
SERVICE CMNT-IMP: NORMAL
STAT OF ADQ CVX/VAG CYTO-IMP: NORMAL
T VAGINALIS RRNA SPEC QL NAA+PROBE: NEGATIVE